# Patient Record
Sex: FEMALE | Race: WHITE | Employment: OTHER | URBAN - METROPOLITAN AREA
[De-identification: names, ages, dates, MRNs, and addresses within clinical notes are randomized per-mention and may not be internally consistent; named-entity substitution may affect disease eponyms.]

---

## 2019-02-05 ENCOUNTER — DOCUMENTATION ONLY (OUTPATIENT)
Dept: CARE COORDINATION | Facility: CLINIC | Age: 69
End: 2019-02-05

## 2019-02-21 ENCOUNTER — OFFICE VISIT (OUTPATIENT)
Dept: NEUROLOGY | Facility: CLINIC | Age: 69
End: 2019-02-21
Payer: COMMERCIAL

## 2019-02-21 VITALS
WEIGHT: 193.9 LBS | BODY MASS INDEX: 35.68 KG/M2 | HEIGHT: 62 IN | RESPIRATION RATE: 16 BRPM | OXYGEN SATURATION: 93 % | HEART RATE: 102 BPM | DIASTOLIC BLOOD PRESSURE: 101 MMHG | SYSTOLIC BLOOD PRESSURE: 166 MMHG | TEMPERATURE: 97.5 F

## 2019-02-21 DIAGNOSIS — R41.3 MEMORY LOSS: ICD-10-CM

## 2019-02-21 DIAGNOSIS — R26.9 ABNORMAL GAIT: Primary | ICD-10-CM

## 2019-02-21 PROBLEM — E78.00 PURE HYPERCHOLESTEROLEMIA: Status: ACTIVE | Noted: 2019-02-21

## 2019-02-21 PROBLEM — G81.94 LEFT HEMIPARESIS (H): Status: ACTIVE | Noted: 2019-02-21

## 2019-02-21 PROBLEM — M47.816 OSTEOARTHRITIS OF LUMBAR SPINE: Status: ACTIVE | Noted: 2017-07-14

## 2019-02-21 PROBLEM — N81.6 RECTOCELE: Status: ACTIVE | Noted: 2019-02-21

## 2019-02-21 PROBLEM — R26.89 IMBALANCE: Status: ACTIVE | Noted: 2017-07-14

## 2019-02-21 PROBLEM — M79.605 BILATERAL LEG PAIN: Status: ACTIVE | Noted: 2017-07-14

## 2019-02-21 PROBLEM — M79.604 BILATERAL LEG PAIN: Status: ACTIVE | Noted: 2017-07-14

## 2019-02-21 PROBLEM — I10 ESSENTIAL HYPERTENSION: Status: ACTIVE | Noted: 2019-02-21

## 2019-02-21 PROBLEM — Z00.00 HEALTH CARE MAINTENANCE: Status: ACTIVE | Noted: 2019-02-21

## 2019-02-21 PROBLEM — M17.11 PRIMARY OSTEOARTHRITIS OF RIGHT KNEE: Status: ACTIVE | Noted: 2018-04-17

## 2019-02-21 LAB — VIT B12 SERPL-MCNC: 875 PG/ML (ref 193–986)

## 2019-02-21 RX ORDER — LISINOPRIL 20 MG/1
20 TABLET ORAL DAILY
COMMUNITY
Start: 2018-04-02

## 2019-02-21 SDOH — HEALTH STABILITY: MENTAL HEALTH: HOW OFTEN DO YOU HAVE A DRINK CONTAINING ALCOHOL?: NEVER

## 2019-02-21 ASSESSMENT — ENCOUNTER SYMPTOMS
LIGHT-HEADEDNESS: 1
HYPOTENSION: 0
SLEEP DISTURBANCES DUE TO BREATHING: 0
NECK PAIN: 0
SEIZURES: 0
ORTHOPNEA: 0
STIFFNESS: 1
SPEECH CHANGE: 0
PARALYSIS: 0
TREMORS: 0
NUMBNESS: 0
HYPERTENSION: 1
JOINT SWELLING: 0
DYSURIA: 0
BACK PAIN: 0
PALPITATIONS: 0
LOSS OF CONSCIOUSNESS: 0
TINGLING: 0
DISTURBANCES IN COORDINATION: 1
MYALGIAS: 1
ARTHRALGIAS: 1
MUSCLE CRAMPS: 1
FLANK PAIN: 0
LEG PAIN: 0
HEADACHES: 1
SYNCOPE: 0
MUSCLE WEAKNESS: 0
EXERCISE INTOLERANCE: 0
DIFFICULTY URINATING: 0
MEMORY LOSS: 1
WEAKNESS: 1
HEMATURIA: 0
DIZZINESS: 1

## 2019-02-21 ASSESSMENT — PAIN SCALES - GENERAL: PAINLEVEL: NO PAIN (0)

## 2019-02-21 ASSESSMENT — MIFFLIN-ST. JEOR: SCORE: 1357.77
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
## 2019-02-21 NOTE — NURSING NOTE
Chief Complaint   Patient presents with     New Patient     [facility] NEW - CEREBRAL VENTRICULOMEGALY / MEMORY IMPAIRMENT     Hanna Martinez
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
## 2019-03-22 ENCOUNTER — THERAPY VISIT (OUTPATIENT)
Dept: PHYSICAL THERAPY | Facility: CLINIC | Age: 69
End: 2019-03-22
Payer: COMMERCIAL

## 2019-03-22 DIAGNOSIS — Z74.09 IMPAIRED FUNCTIONAL MOBILITY, BALANCE, GAIT, AND ENDURANCE: Primary | ICD-10-CM

## 2019-03-22 NOTE — DISCHARGE INSTRUCTIONS
3/22/19   - PT recommends using the walker at all times to help with balance: inside your house and outside as well.   - Goal of 3x week to get out and walk - at the gym: goal of 2 laps to start, when walking outside: 10-15 minutes   - Focus on upright posture and wider base of support with your feet while walking with the walker.
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
## 2019-03-29 ENCOUNTER — THERAPY VISIT (OUTPATIENT)
Dept: PHYSICAL THERAPY | Facility: CLINIC | Age: 69
End: 2019-03-29
Payer: COMMERCIAL

## 2019-03-29 ENCOUNTER — OFFICE VISIT (OUTPATIENT)
Dept: NEUROLOGY | Facility: CLINIC | Age: 69
End: 2019-03-29
Payer: COMMERCIAL

## 2019-03-29 VITALS
HEART RATE: 66 BPM | SYSTOLIC BLOOD PRESSURE: 125 MMHG | WEIGHT: 194.67 LBS | BODY MASS INDEX: 35.6 KG/M2 | OXYGEN SATURATION: 94 % | DIASTOLIC BLOOD PRESSURE: 78 MMHG

## 2019-03-29 DIAGNOSIS — R26.9 ABNORMAL GAIT: ICD-10-CM

## 2019-03-29 DIAGNOSIS — R41.3 MEMORY LOSS: Primary | ICD-10-CM

## 2019-03-29 ASSESSMENT — PAIN SCALES - GENERAL: PAINLEVEL: NO PAIN (0)

## 2019-03-29 NOTE — NURSING NOTE
Chief Complaint   Patient presents with     RECHECK     [facility] CONSULT       Martha Kingston, EMT
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
## 2019-04-08 ENCOUNTER — TELEPHONE (OUTPATIENT)
Dept: PHYSICAL THERAPY | Facility: CLINIC | Age: 69
End: 2019-04-08

## 2019-04-08 NOTE — TELEPHONE ENCOUNTER
----- Message from Alissa Dempsey, PT sent at 4/5/2019 12:09 PM CDT -----  Hello,     Can someone please call the above patient to see if she would like to schedule more PT appointments for follow up? If so, please schedule up to 4 visits 1x/week with Rosa Durham, ALOK.     Thank you!     Alissa Dempsey, PT

## 2019-04-29 ENCOUNTER — THERAPY VISIT (OUTPATIENT)
Dept: PHYSICAL THERAPY | Facility: CLINIC | Age: 69
End: 2019-04-29
Payer: COMMERCIAL

## 2019-04-29 DIAGNOSIS — R26.9 ABNORMAL GAIT: Primary | ICD-10-CM

## 2019-04-29 DIAGNOSIS — Z74.09 IMPAIRED FUNCTIONAL MOBILITY, BALANCE, GAIT, AND ENDURANCE: ICD-10-CM

## 2019-04-29 NOTE — DISCHARGE INSTRUCTIONS
4/29/19  - Add eye exercises to home exercise program. Complete at least 3-5x/day  - Add coordination exercises: toe taps to target placed on floor- forward and cross over. 30 seconds each at least 2x/day

## 2019-09-25 NOTE — PROGRESS NOTES
Outpatient Physical Therapy Discharge Note     Patient: Briseyda Garcia  : 1950    Beginning/End Dates of Reporting Period:  2019 to 2019    Referring Provider: Brenda Arellano MD    Therapy Diagnosis: Abnormal gait/dizziness     Client Self Report: When my blood sugar is low I get dizzy. Before I had lunch and it was 84. Reports no falls in the past 3-4 weeks. I was hoping for exercises to strengthen my core and balance. I've been walking the track, I touch the wall when I'm walking, using the right hand. Getting leg cramps with walking. In the back as well, and gastrocs.     Objective Measurements:         Goals:  Goal Identifier Gait    Goal Description Pt will ambulate 25 feet with AAD in 9 seconds or less with improved gait mechanics including, adequate foot clearance, improved step length, upright posture and no signs of imbalance    Target Date 19   Date Met      Progress:     Goal Identifier TUG    Goal Description Pt will improve score on TUG with AAD to 13.5 seconds or less with no imbalance in order to decrease risk for falling    Target Date 19   Date Met      Progress:     Goal Identifier mCTSIB    Goal Description Pt will improve score on conditions 4 and 5 of mCTSIB to 20 seconds or greater to demonstrate improved static balance    Target Date 19   Date Met      Progress:     Goal Identifier Falls prevention    Goal Description Pt will verbalize and/or demonstrate 3-5 falls prevention strategies in order to improve safety and decrease risk for falling    Target Date 19   Date Met      Progress:       Progress Toward Goals:   Not assessed this period.    Plan:  Discharge from therapy.    Discharge:    Reason for Discharge: Patient has failed to schedule further appointments.    Equipment Issued: N/A    Discharge Plan: Patient to continue home program.

## 2019-11-04 ENCOUNTER — HOSPITAL ENCOUNTER (INPATIENT)
Facility: CLINIC | Age: 69
Setting detail: SURGERY ADMIT
End: 2019-11-04
Attending: COLON & RECTAL SURGERY | Admitting: COLON & RECTAL SURGERY
Payer: COMMERCIAL

## 2019-11-05 ENCOUNTER — TRANSFERRED RECORDS (OUTPATIENT)
Dept: HEALTH INFORMATION MANAGEMENT | Facility: CLINIC | Age: 69
End: 2019-11-05

## 2020-01-09 VITALS — BODY MASS INDEX: 35.88 KG/M2 | WEIGHT: 195 LBS | HEIGHT: 62 IN

## 2020-01-09 ASSESSMENT — MIFFLIN-ST. JEOR: SCORE: 1357.76

## 2020-02-07 ENCOUNTER — HOSPITAL ENCOUNTER (INPATIENT)
Facility: CLINIC | Age: 70
Setting detail: SURGERY ADMIT
End: 2020-02-07
Attending: COLON & RECTAL SURGERY | Admitting: COLON & RECTAL SURGERY
Payer: COMMERCIAL

## 2020-03-13 VITALS — BODY MASS INDEX: 37.84 KG/M2 | WEIGHT: 200.4 LBS | HEIGHT: 61 IN

## 2020-03-13 RX ORDER — CLONIDINE HYDROCHLORIDE 0.1 MG/1
0.1 TABLET ORAL 2 TIMES DAILY
COMMUNITY
End: 2020-03-16 | Stop reason: HOSPADM

## 2020-03-13 ASSESSMENT — MIFFLIN-ST. JEOR: SCORE: 1373.04

## 2020-03-13 NOTE — OR NURSING
Patient states she was put on a new medication for her blood pressure.  She is supposed to take it twice a day but has been taking it only once a day due to feeling weak because of low blood pressure.  She states she had been falling also because of low blood pressure.  She had an echocardiogram recently which was abnormal but no follow up was recommended.      I brought this chart to the [facility]'s who stated she needs to be optimized for surgery and cleared by her cardiologist.    I called the patient back and asked her to make an appointment with Dr. Aguilar for the above and she stated that he did clear her for surgery over the phone.  We do not have any notes from him since 2019.  I will call his office to ask for this note.

## 2020-03-15 RX ORDER — CIPROFLOXACIN 2 MG/ML
400 INJECTION, SOLUTION INTRAVENOUS SEE ADMIN INSTRUCTIONS
Status: CANCELLED | OUTPATIENT
Start: 2020-03-15

## 2020-03-15 RX ORDER — ALVIMOPAN 12 MG/1
12 CAPSULE ORAL ONCE
Status: CANCELLED | OUTPATIENT
Start: 2020-03-15 | End: 2020-03-15

## 2020-03-15 RX ORDER — CIPROFLOXACIN 2 MG/ML
400 INJECTION, SOLUTION INTRAVENOUS
Status: CANCELLED | OUTPATIENT
Start: 2020-03-15

## 2020-03-15 RX ORDER — HEPARIN SODIUM 5000 [USP'U]/.5ML
5000 INJECTION, SOLUTION INTRAVENOUS; SUBCUTANEOUS
Status: CANCELLED | OUTPATIENT
Start: 2020-03-15

## 2020-03-15 RX ORDER — ACETAMINOPHEN 325 MG/1
975 TABLET ORAL ONCE
Status: CANCELLED | OUTPATIENT
Start: 2020-03-15 | End: 2020-03-15

## 2020-05-01 DIAGNOSIS — Z11.59 ENCOUNTER FOR SCREENING FOR OTHER VIRAL DISEASES: Primary | ICD-10-CM

## 2020-05-19 ENCOUNTER — TRANSFERRED RECORDS (OUTPATIENT)
Dept: HEALTH INFORMATION MANAGEMENT | Facility: CLINIC | Age: 70
End: 2020-05-19

## 2020-07-30 RX ORDER — CLONIDINE 0.1 MG/24H
1 PATCH, EXTENDED RELEASE TRANSDERMAL WEEKLY
COMMUNITY

## 2020-07-30 RX ORDER — CLONIDINE HYDROCHLORIDE 0.1 MG/1
0.1 TABLET ORAL DAILY PRN
COMMUNITY

## 2020-07-31 NOTE — PHARMACY
07/31/20 - Per patient's . pt has hypertropic cardiomyopathy; if blood pressure drops need to use phenylephrine; refer to Dr. Aguilar (Cardiology) note from [facility]; Please relay information to Anesthesia. Zoë Richmond, MikhailD

## 2020-07-31 NOTE — PHARMACY-ADMISSION MEDICATION HISTORY
PTA meds completed by pre-admitting nurse Verna Goldstein RN on 7/30/2020 and reviewed by pharmacy. Pharmacist called patient to clarify home medications. Added clonidine patch and tablet to med list.       Prior to Admission medications    Medication Sig Last Dose Taking? Auth Provider   aspirin (ASA) 81 MG tablet Take 81 mg by mouth daily   Yes Reported, Patient   cloNIDine (CATAPRES) 0.1 MG tablet Take 0.1 mg by mouth daily as needed When diastolic BP is over 100 mmhg.  Yes Unknown, Entered By History   cloNIDine (CATAPRES-TTS1) 0.1 MG/24HR WK patch Place 1 patch onto the skin once a week On tuesdays 7/28/2020 Yes Unknown, Entered By History   lisinopril (PRINIVIL/ZESTRIL) 20 MG tablet Take 20 mg by mouth daily   Yes Reported, Patient

## 2020-08-03 DIAGNOSIS — Z01.818 PRE-OP EXAM: Primary | ICD-10-CM

## 2020-08-03 PROCEDURE — U0003 INFECTIOUS AGENT DETECTION BY NUCLEIC ACID (DNA OR RNA); SEVERE ACUTE RESPIRATORY SYNDROME CORONAVIRUS 2 (SARS-COV-2) (CORONAVIRUS DISEASE [COVID-19]), AMPLIFIED PROBE TECHNIQUE, MAKING USE OF HIGH THROUGHPUT TECHNOLOGIES AS DESCRIBED BY CMS-2020-01-R: HCPCS | Performed by: COLON & RECTAL SURGERY

## 2020-08-04 LAB
SARS-COV-2 RNA SPEC QL NAA+PROBE: NOT DETECTED
SPECIMEN SOURCE: NORMAL

## 2020-08-05 ENCOUNTER — ANESTHESIA EVENT (OUTPATIENT)
Dept: SURGERY | Facility: CLINIC | Age: 70
DRG: 330 | End: 2020-08-05
Payer: COMMERCIAL

## 2020-08-05 ENCOUNTER — HOSPITAL ENCOUNTER (INPATIENT)
Facility: CLINIC | Age: 70
LOS: 5 days | Discharge: NURSING, REHAB OR LONG-TERM CARE | DRG: 330 | End: 2020-08-10
Attending: COLON & RECTAL SURGERY | Admitting: COLON & RECTAL SURGERY
Payer: COMMERCIAL

## 2020-08-05 ENCOUNTER — ANESTHESIA (OUTPATIENT)
Dept: SURGERY | Facility: CLINIC | Age: 70
DRG: 330 | End: 2020-08-05
Payer: COMMERCIAL

## 2020-08-05 PROBLEM — D12.2 ADENOMATOUS POLYP OF ASCENDING COLON: Status: ACTIVE | Noted: 2020-08-05

## 2020-08-05 LAB
CREAT SERPL-MCNC: 0.72 MG/DL (ref 0.52–1.04)
GFR SERPL CREATININE-BSD FRML MDRD: 84 ML/MIN/{1.73_M2}
PLATELET # BLD AUTO: 200 10E9/L (ref 150–450)

## 2020-08-05 PROCEDURE — 40000306 ZZH STATISTIC PRE PROC ASSESS II: Performed by: COLON & RECTAL SURGERY

## 2020-08-05 PROCEDURE — 88305 TISSUE EXAM BY PATHOLOGIST: CPT | Performed by: COLON & RECTAL SURGERY

## 2020-08-05 PROCEDURE — 71000012 ZZH RECOVERY PHASE 1 LEVEL 1 FIRST HR: Performed by: COLON & RECTAL SURGERY

## 2020-08-05 PROCEDURE — 37000009 ZZH ANESTHESIA TECHNICAL FEE, EACH ADDTL 15 MIN: Performed by: COLON & RECTAL SURGERY

## 2020-08-05 PROCEDURE — 82565 ASSAY OF CREATININE: CPT | Performed by: COLON & RECTAL SURGERY

## 2020-08-05 PROCEDURE — 25800030 ZZH RX IP 258 OP 636: Performed by: ANESTHESIOLOGY

## 2020-08-05 PROCEDURE — 25000128 H RX IP 250 OP 636: Performed by: NURSE ANESTHETIST, CERTIFIED REGISTERED

## 2020-08-05 PROCEDURE — 37000008 ZZH ANESTHESIA TECHNICAL FEE, 1ST 30 MIN: Performed by: COLON & RECTAL SURGERY

## 2020-08-05 PROCEDURE — 25000128 H RX IP 250 OP 636: Performed by: COLON & RECTAL SURGERY

## 2020-08-05 PROCEDURE — 36000063 ZZH SURGERY LEVEL 4 EA 15 ADDTL MIN: Performed by: COLON & RECTAL SURGERY

## 2020-08-05 PROCEDURE — 85049 AUTOMATED PLATELET COUNT: CPT | Performed by: COLON & RECTAL SURGERY

## 2020-08-05 PROCEDURE — 0DBP7ZZ EXCISION OF RECTUM, VIA NATURAL OR ARTIFICIAL OPENING: ICD-10-PCS | Performed by: COLON & RECTAL SURGERY

## 2020-08-05 PROCEDURE — 88305 TISSUE EXAM BY PATHOLOGIST: CPT | Mod: 26 | Performed by: COLON & RECTAL SURGERY

## 2020-08-05 PROCEDURE — 25000125 ZZHC RX 250: Performed by: NURSE ANESTHETIST, CERTIFIED REGISTERED

## 2020-08-05 PROCEDURE — 36000093 ZZH SURGERY LEVEL 4 1ST 30 MIN: Performed by: COLON & RECTAL SURGERY

## 2020-08-05 PROCEDURE — 88309 TISSUE EXAM BY PATHOLOGIST: CPT | Mod: 26 | Performed by: COLON & RECTAL SURGERY

## 2020-08-05 PROCEDURE — 88309 TISSUE EXAM BY PATHOLOGIST: CPT | Performed by: COLON & RECTAL SURGERY

## 2020-08-05 PROCEDURE — 71000013 ZZH RECOVERY PHASE 1 LEVEL 1 EA ADDTL HR: Performed by: COLON & RECTAL SURGERY

## 2020-08-05 PROCEDURE — 25000125 ZZHC RX 250: Performed by: COLON & RECTAL SURGERY

## 2020-08-05 PROCEDURE — 12000000 ZZH R&B MED SURG/OB

## 2020-08-05 PROCEDURE — 25000132 ZZH RX MED GY IP 250 OP 250 PS 637: Performed by: COLON & RECTAL SURGERY

## 2020-08-05 PROCEDURE — 36415 COLL VENOUS BLD VENIPUNCTURE: CPT | Performed by: COLON & RECTAL SURGERY

## 2020-08-05 PROCEDURE — 0DJD8ZZ INSPECTION OF LOWER INTESTINAL TRACT, VIA NATURAL OR ARTIFICIAL OPENING ENDOSCOPIC: ICD-10-PCS | Performed by: COLON & RECTAL SURGERY

## 2020-08-05 PROCEDURE — 25800030 ZZH RX IP 258 OP 636: Performed by: COLON & RECTAL SURGERY

## 2020-08-05 PROCEDURE — 25800030 ZZH RX IP 258 OP 636: Performed by: NURSE ANESTHETIST, CERTIFIED REGISTERED

## 2020-08-05 PROCEDURE — 27210794 ZZH OR GENERAL SUPPLY STERILE: Performed by: COLON & RECTAL SURGERY

## 2020-08-05 PROCEDURE — 0DTF0ZZ RESECTION OF RIGHT LARGE INTESTINE, OPEN APPROACH: ICD-10-PCS | Performed by: COLON & RECTAL SURGERY

## 2020-08-05 RX ORDER — ALVIMOPAN 12 MG/1
12 CAPSULE ORAL 2 TIMES DAILY
Status: DISCONTINUED | OUTPATIENT
Start: 2020-08-06 | End: 2020-08-08

## 2020-08-05 RX ORDER — ACETAMINOPHEN 325 MG/1
975 TABLET ORAL ONCE
Status: COMPLETED | OUTPATIENT
Start: 2020-08-05 | End: 2020-08-05

## 2020-08-05 RX ORDER — CIPROFLOXACIN 2 MG/ML
400 INJECTION, SOLUTION INTRAVENOUS EVERY 12 HOURS
Status: COMPLETED | OUTPATIENT
Start: 2020-08-06 | End: 2020-08-06

## 2020-08-05 RX ORDER — ONDANSETRON 2 MG/ML
INJECTION INTRAMUSCULAR; INTRAVENOUS PRN
Status: DISCONTINUED | OUTPATIENT
Start: 2020-08-05 | End: 2020-08-05

## 2020-08-05 RX ORDER — EPHEDRINE SULFATE 50 MG/ML
INJECTION, SOLUTION INTRAMUSCULAR; INTRAVENOUS; SUBCUTANEOUS PRN
Status: DISCONTINUED | OUTPATIENT
Start: 2020-08-05 | End: 2020-08-05

## 2020-08-05 RX ORDER — MEPERIDINE HYDROCHLORIDE 25 MG/ML
12.5 INJECTION INTRAMUSCULAR; INTRAVENOUS; SUBCUTANEOUS
Status: DISCONTINUED | OUTPATIENT
Start: 2020-08-05 | End: 2020-08-05 | Stop reason: HOSPADM

## 2020-08-05 RX ORDER — LIDOCAINE 40 MG/G
CREAM TOPICAL
Status: DISCONTINUED | OUTPATIENT
Start: 2020-08-05 | End: 2020-08-10 | Stop reason: HOSPADM

## 2020-08-05 RX ORDER — LIDOCAINE HYDROCHLORIDE 10 MG/ML
INJECTION, SOLUTION INFILTRATION; PERINEURAL PRN
Status: DISCONTINUED | OUTPATIENT
Start: 2020-08-05 | End: 2020-08-05

## 2020-08-05 RX ORDER — HYDRALAZINE HYDROCHLORIDE 20 MG/ML
2.5-5 INJECTION INTRAMUSCULAR; INTRAVENOUS EVERY 10 MIN PRN
Status: DISCONTINUED | OUTPATIENT
Start: 2020-08-05 | End: 2020-08-05 | Stop reason: HOSPADM

## 2020-08-05 RX ORDER — FENTANYL CITRATE 50 UG/ML
25-50 INJECTION, SOLUTION INTRAMUSCULAR; INTRAVENOUS
Status: DISCONTINUED | OUTPATIENT
Start: 2020-08-05 | End: 2020-08-05 | Stop reason: HOSPADM

## 2020-08-05 RX ORDER — NALOXONE HYDROCHLORIDE 0.4 MG/ML
.1-.4 INJECTION, SOLUTION INTRAMUSCULAR; INTRAVENOUS; SUBCUTANEOUS
Status: DISCONTINUED | OUTPATIENT
Start: 2020-08-05 | End: 2020-08-05 | Stop reason: HOSPADM

## 2020-08-05 RX ORDER — CIPROFLOXACIN 2 MG/ML
400 INJECTION, SOLUTION INTRAVENOUS
Status: DISCONTINUED | OUTPATIENT
Start: 2020-08-05 | End: 2020-08-05 | Stop reason: HOSPADM

## 2020-08-05 RX ORDER — OXYCODONE HYDROCHLORIDE 5 MG/1
5 TABLET ORAL EVERY 4 HOURS PRN
Status: DISCONTINUED | OUTPATIENT
Start: 2020-08-05 | End: 2020-08-10 | Stop reason: HOSPADM

## 2020-08-05 RX ORDER — ALVIMOPAN 12 MG/1
12 CAPSULE ORAL ONCE
Status: COMPLETED | OUTPATIENT
Start: 2020-08-05 | End: 2020-08-05

## 2020-08-05 RX ORDER — SODIUM CHLORIDE, SODIUM LACTATE, POTASSIUM CHLORIDE, CALCIUM CHLORIDE 600; 310; 30; 20 MG/100ML; MG/100ML; MG/100ML; MG/100ML
INJECTION, SOLUTION INTRAVENOUS CONTINUOUS
Status: DISCONTINUED | OUTPATIENT
Start: 2020-08-05 | End: 2020-08-05 | Stop reason: HOSPADM

## 2020-08-05 RX ORDER — ALBUTEROL SULFATE 0.83 MG/ML
2.5 SOLUTION RESPIRATORY (INHALATION) EVERY 4 HOURS PRN
Status: DISCONTINUED | OUTPATIENT
Start: 2020-08-05 | End: 2020-08-05 | Stop reason: HOSPADM

## 2020-08-05 RX ORDER — FENTANYL CITRATE 50 UG/ML
INJECTION, SOLUTION INTRAMUSCULAR; INTRAVENOUS PRN
Status: DISCONTINUED | OUTPATIENT
Start: 2020-08-05 | End: 2020-08-05

## 2020-08-05 RX ORDER — METOPROLOL TARTRATE 1 MG/ML
1-2 INJECTION, SOLUTION INTRAVENOUS EVERY 5 MIN PRN
Status: DISCONTINUED | OUTPATIENT
Start: 2020-08-05 | End: 2020-08-05 | Stop reason: HOSPADM

## 2020-08-05 RX ORDER — ONDANSETRON 4 MG/1
4 TABLET, ORALLY DISINTEGRATING ORAL EVERY 30 MIN PRN
Status: DISCONTINUED | OUTPATIENT
Start: 2020-08-05 | End: 2020-08-05 | Stop reason: HOSPADM

## 2020-08-05 RX ORDER — NEOSTIGMINE METHYLSULFATE 1 MG/ML
VIAL (ML) INJECTION PRN
Status: DISCONTINUED | OUTPATIENT
Start: 2020-08-05 | End: 2020-08-05

## 2020-08-05 RX ORDER — PROPOFOL 10 MG/ML
INJECTION, EMULSION INTRAVENOUS PRN
Status: DISCONTINUED | OUTPATIENT
Start: 2020-08-05 | End: 2020-08-05

## 2020-08-05 RX ORDER — DEXAMETHASONE SODIUM PHOSPHATE 4 MG/ML
INJECTION, SOLUTION INTRA-ARTICULAR; INTRALESIONAL; INTRAMUSCULAR; INTRAVENOUS; SOFT TISSUE PRN
Status: DISCONTINUED | OUTPATIENT
Start: 2020-08-05 | End: 2020-08-05

## 2020-08-05 RX ORDER — ACETAMINOPHEN 325 MG/1
975 TABLET ORAL EVERY 8 HOURS
Status: DISPENSED | OUTPATIENT
Start: 2020-08-05 | End: 2020-08-08

## 2020-08-05 RX ORDER — BUPIVACAINE HYDROCHLORIDE AND EPINEPHRINE 2.5; 5 MG/ML; UG/ML
INJECTION, SOLUTION EPIDURAL; INFILTRATION; INTRACAUDAL; PERINEURAL PRN
Status: DISCONTINUED | OUTPATIENT
Start: 2020-08-05 | End: 2020-08-05 | Stop reason: HOSPADM

## 2020-08-05 RX ORDER — NALOXONE HYDROCHLORIDE 0.4 MG/ML
.1-.4 INJECTION, SOLUTION INTRAMUSCULAR; INTRAVENOUS; SUBCUTANEOUS
Status: DISCONTINUED | OUTPATIENT
Start: 2020-08-05 | End: 2020-08-10 | Stop reason: HOSPADM

## 2020-08-05 RX ORDER — ONDANSETRON 2 MG/ML
4 INJECTION INTRAMUSCULAR; INTRAVENOUS EVERY 6 HOURS PRN
Status: DISCONTINUED | OUTPATIENT
Start: 2020-08-05 | End: 2020-08-10 | Stop reason: HOSPADM

## 2020-08-05 RX ORDER — SODIUM CHLORIDE, SODIUM LACTATE, POTASSIUM CHLORIDE, CALCIUM CHLORIDE 600; 310; 30; 20 MG/100ML; MG/100ML; MG/100ML; MG/100ML
INJECTION, SOLUTION INTRAVENOUS CONTINUOUS
Status: DISCONTINUED | OUTPATIENT
Start: 2020-08-05 | End: 2020-08-08

## 2020-08-05 RX ORDER — SODIUM CHLORIDE, SODIUM LACTATE, POTASSIUM CHLORIDE, CALCIUM CHLORIDE 600; 310; 30; 20 MG/100ML; MG/100ML; MG/100ML; MG/100ML
INJECTION, SOLUTION INTRAVENOUS CONTINUOUS PRN
Status: DISCONTINUED | OUTPATIENT
Start: 2020-08-05 | End: 2020-08-05

## 2020-08-05 RX ORDER — HEPARIN SODIUM 5000 [USP'U]/.5ML
5000 INJECTION, SOLUTION INTRAVENOUS; SUBCUTANEOUS
Status: DISCONTINUED | OUTPATIENT
Start: 2020-08-05 | End: 2020-08-05 | Stop reason: HOSPADM

## 2020-08-05 RX ORDER — CIPROFLOXACIN 2 MG/ML
400 INJECTION, SOLUTION INTRAVENOUS SEE ADMIN INSTRUCTIONS
Status: DISCONTINUED | OUTPATIENT
Start: 2020-08-05 | End: 2020-08-05 | Stop reason: HOSPADM

## 2020-08-05 RX ORDER — LIDOCAINE 40 MG/G
CREAM TOPICAL
Status: DISCONTINUED | OUTPATIENT
Start: 2020-08-05 | End: 2020-08-05 | Stop reason: HOSPADM

## 2020-08-05 RX ORDER — GLYCOPYRROLATE 0.2 MG/ML
INJECTION, SOLUTION INTRAMUSCULAR; INTRAVENOUS PRN
Status: DISCONTINUED | OUTPATIENT
Start: 2020-08-05 | End: 2020-08-05

## 2020-08-05 RX ORDER — HYDROMORPHONE HYDROCHLORIDE 1 MG/ML
.2-.3 INJECTION, SOLUTION INTRAMUSCULAR; INTRAVENOUS; SUBCUTANEOUS
Status: DISCONTINUED | OUTPATIENT
Start: 2020-08-05 | End: 2020-08-10 | Stop reason: HOSPADM

## 2020-08-05 RX ORDER — MINERAL OIL
OIL (ML) MISCELLANEOUS PRN
Status: DISCONTINUED | OUTPATIENT
Start: 2020-08-05 | End: 2020-08-05 | Stop reason: HOSPADM

## 2020-08-05 RX ORDER — FENTANYL CITRATE 50 UG/ML
25-50 INJECTION, SOLUTION INTRAMUSCULAR; INTRAVENOUS EVERY 5 MIN PRN
Status: DISCONTINUED | OUTPATIENT
Start: 2020-08-05 | End: 2020-08-05 | Stop reason: HOSPADM

## 2020-08-05 RX ORDER — HYDROMORPHONE HYDROCHLORIDE 1 MG/ML
.3-.5 INJECTION, SOLUTION INTRAMUSCULAR; INTRAVENOUS; SUBCUTANEOUS EVERY 10 MIN PRN
Status: DISCONTINUED | OUTPATIENT
Start: 2020-08-05 | End: 2020-08-05 | Stop reason: HOSPADM

## 2020-08-05 RX ORDER — ONDANSETRON 2 MG/ML
4 INJECTION INTRAMUSCULAR; INTRAVENOUS EVERY 30 MIN PRN
Status: DISCONTINUED | OUTPATIENT
Start: 2020-08-05 | End: 2020-08-05 | Stop reason: HOSPADM

## 2020-08-05 RX ADMIN — PHENYLEPHRINE HYDROCHLORIDE 200 MCG: 10 INJECTION INTRAVENOUS at 13:50

## 2020-08-05 RX ADMIN — METRONIDAZOLE 500 MG: 500 INJECTION, SOLUTION INTRAVENOUS at 13:33

## 2020-08-05 RX ADMIN — ONDANSETRON HYDROCHLORIDE 4 MG: 2 INJECTION, SOLUTION INTRAVENOUS at 18:00

## 2020-08-05 RX ADMIN — FENTANYL CITRATE 100 MCG: 50 INJECTION, SOLUTION INTRAMUSCULAR; INTRAVENOUS at 13:07

## 2020-08-05 RX ADMIN — MIDAZOLAM 1 MG: 1 INJECTION INTRAMUSCULAR; INTRAVENOUS at 13:07

## 2020-08-05 RX ADMIN — SODIUM CHLORIDE, POTASSIUM CHLORIDE, SODIUM LACTATE AND CALCIUM CHLORIDE: 600; 310; 30; 20 INJECTION, SOLUTION INTRAVENOUS at 14:46

## 2020-08-05 RX ADMIN — ACETAMINOPHEN 975 MG: 325 TABLET, FILM COATED ORAL at 12:21

## 2020-08-05 RX ADMIN — GLYCOPYRROLATE 0.6 MG: 0.2 INJECTION, SOLUTION INTRAMUSCULAR; INTRAVENOUS at 18:21

## 2020-08-05 RX ADMIN — HYDROMORPHONE HYDROCHLORIDE 0.3 MG: 1 INJECTION, SOLUTION INTRAMUSCULAR; INTRAVENOUS; SUBCUTANEOUS at 21:44

## 2020-08-05 RX ADMIN — FENTANYL CITRATE 50 MCG: 50 INJECTION, SOLUTION INTRAMUSCULAR; INTRAVENOUS at 16:22

## 2020-08-05 RX ADMIN — Medication 10 MG: at 14:07

## 2020-08-05 RX ADMIN — MIDAZOLAM 1 MG: 1 INJECTION INTRAMUSCULAR; INTRAVENOUS at 13:01

## 2020-08-05 RX ADMIN — PHENYLEPHRINE HYDROCHLORIDE 100 MCG: 10 INJECTION INTRAVENOUS at 14:24

## 2020-08-05 RX ADMIN — FENTANYL CITRATE 100 MCG: 50 INJECTION, SOLUTION INTRAMUSCULAR; INTRAVENOUS at 15:38

## 2020-08-05 RX ADMIN — Medication 3 MG: at 18:21

## 2020-08-05 RX ADMIN — SODIUM CHLORIDE, POTASSIUM CHLORIDE, SODIUM LACTATE AND CALCIUM CHLORIDE: 600; 310; 30; 20 INJECTION, SOLUTION INTRAVENOUS at 20:06

## 2020-08-05 RX ADMIN — GLYCOPYRROLATE 0.1 MG: 0.2 INJECTION, SOLUTION INTRAMUSCULAR; INTRAVENOUS at 17:09

## 2020-08-05 RX ADMIN — ROCURONIUM BROMIDE 50 MG: 10 INJECTION INTRAVENOUS at 13:07

## 2020-08-05 RX ADMIN — ROCURONIUM BROMIDE 20 MG: 10 INJECTION INTRAVENOUS at 14:07

## 2020-08-05 RX ADMIN — PHENYLEPHRINE HYDROCHLORIDE 200 MCG: 10 INJECTION INTRAVENOUS at 14:35

## 2020-08-05 RX ADMIN — ROCURONIUM BROMIDE 20 MG: 10 INJECTION INTRAVENOUS at 15:12

## 2020-08-05 RX ADMIN — DEXAMETHASONE SODIUM PHOSPHATE 4 MG: 4 INJECTION, SOLUTION INTRA-ARTICULAR; INTRALESIONAL; INTRAMUSCULAR; INTRAVENOUS; SOFT TISSUE at 13:07

## 2020-08-05 RX ADMIN — PHENYLEPHRINE HYDROCHLORIDE 100 MCG: 10 INJECTION INTRAVENOUS at 13:07

## 2020-08-05 RX ADMIN — FENTANYL CITRATE 100 MCG: 50 INJECTION, SOLUTION INTRAMUSCULAR; INTRAVENOUS at 15:59

## 2020-08-05 RX ADMIN — METRONIDAZOLE 500 MG: 500 INJECTION, SOLUTION INTRAVENOUS at 21:44

## 2020-08-05 RX ADMIN — Medication 5 MG: at 15:49

## 2020-08-05 RX ADMIN — Medication 10 MG: at 14:21

## 2020-08-05 RX ADMIN — HYDROMORPHONE HYDROCHLORIDE 1 MG: 1 INJECTION, SOLUTION INTRAMUSCULAR; INTRAVENOUS; SUBCUTANEOUS at 13:07

## 2020-08-05 RX ADMIN — LIDOCAINE HYDROCHLORIDE 20 MG: 10 INJECTION, SOLUTION INFILTRATION; PERINEURAL at 13:07

## 2020-08-05 RX ADMIN — SODIUM CHLORIDE, POTASSIUM CHLORIDE, SODIUM LACTATE AND CALCIUM CHLORIDE: 600; 310; 30; 20 INJECTION, SOLUTION INTRAVENOUS at 13:50

## 2020-08-05 RX ADMIN — ROCURONIUM BROMIDE 20 MG: 10 INJECTION INTRAVENOUS at 13:43

## 2020-08-05 RX ADMIN — ACETAMINOPHEN 975 MG: 325 TABLET, FILM COATED ORAL at 21:44

## 2020-08-05 RX ADMIN — ALVIMOPAN 12 MG: 12 CAPSULE ORAL at 12:21

## 2020-08-05 RX ADMIN — CIPROFLOXACIN 400 MG: 2 INJECTION, SOLUTION INTRAVENOUS at 23:31

## 2020-08-05 RX ADMIN — PHENYLEPHRINE HYDROCHLORIDE 200 MCG: 10 INJECTION INTRAVENOUS at 13:25

## 2020-08-05 RX ADMIN — PHENYLEPHRINE HYDROCHLORIDE 200 MCG: 10 INJECTION INTRAVENOUS at 13:57

## 2020-08-05 RX ADMIN — PROPOFOL 140 MG: 10 INJECTION, EMULSION INTRAVENOUS at 13:07

## 2020-08-05 RX ADMIN — Medication 0.3 MCG/KG/HR: at 14:00

## 2020-08-05 RX ADMIN — FENTANYL CITRATE 100 MCG: 50 INJECTION, SOLUTION INTRAMUSCULAR; INTRAVENOUS at 16:06

## 2020-08-05 RX ADMIN — SODIUM CHLORIDE, POTASSIUM CHLORIDE, SODIUM LACTATE AND CALCIUM CHLORIDE: 600; 310; 30; 20 INJECTION, SOLUTION INTRAVENOUS at 16:48

## 2020-08-05 RX ADMIN — SODIUM CHLORIDE, POTASSIUM CHLORIDE, SODIUM LACTATE AND CALCIUM CHLORIDE: 600; 310; 30; 20 INJECTION, SOLUTION INTRAVENOUS at 12:06

## 2020-08-05 RX ADMIN — ROCURONIUM BROMIDE 10 MG: 10 INJECTION INTRAVENOUS at 15:37

## 2020-08-05 RX ADMIN — PHENYLEPHRINE HYDROCHLORIDE 200 MCG: 10 INJECTION INTRAVENOUS at 17:45

## 2020-08-05 RX ADMIN — PHENYLEPHRINE HYDROCHLORIDE 200 MCG: 10 INJECTION INTRAVENOUS at 17:55

## 2020-08-05 RX ADMIN — ROCURONIUM BROMIDE 20 MG: 10 INJECTION INTRAVENOUS at 17:42

## 2020-08-05 RX ADMIN — FENTANYL CITRATE 50 MCG: 50 INJECTION, SOLUTION INTRAMUSCULAR; INTRAVENOUS at 15:43

## 2020-08-05 RX ADMIN — PHENYLEPHRINE HYDROCHLORIDE 200 MCG: 10 INJECTION INTRAVENOUS at 13:39

## 2020-08-05 RX ADMIN — SODIUM CHLORIDE, POTASSIUM CHLORIDE, SODIUM LACTATE AND CALCIUM CHLORIDE: 600; 310; 30; 20 INJECTION, SOLUTION INTRAVENOUS at 17:21

## 2020-08-05 RX ADMIN — CIPROFLOXACIN 400 MG: 2 INJECTION INTRAVENOUS at 13:03

## 2020-08-05 ASSESSMENT — MIFFLIN-ST. JEOR: SCORE: 1383.25

## 2020-08-05 ASSESSMENT — ACTIVITIES OF DAILY LIVING (ADL): ADLS_ACUITY_SCORE: 18

## 2020-08-05 NOTE — ANESTHESIA CARE TRANSFER NOTE
Patient: Briseyda Garcia    Procedure(s):  Laparoscopic assisted right colectomy  with transanal endoscopic microsurgery    Diagnosis: History of colon polyps [Z86.010]  Rectal polyp [K62.1]  Diagnosis Additional Information: No value filed.    Anesthesia Type:   General     Note:  Airway :ETT  Patient transferred to:PACU  Handoff Report: Identifed the Patient, Identified the Reponsible Provider, Reviewed the pertinent medical history, Discussed the surgical course, Reviewed Intra-OP anesthesia mangement and issues during anesthesia, Set expectations for post-procedure period and Allowed opportunity for questions and acknowledgement of understanding      Vitals: (Last set prior to Anesthesia Care Transfer)    CRNA VITALS  8/5/2020 1801 - 8/5/2020 1838      8/5/2020             Pulse:  64    SpO2:  97 %    Resp Rate (observed):  10                Electronically Signed By: JJ Grubbs CRNA  August 5, 2020  6:38 PM

## 2020-08-05 NOTE — BRIEF OP NOTE
[facility]    Brief Operative Note    Pre-operative diagnosis: History of colon polyps [Z86.010]  Rectal polyp [K62.1]  Post-operative diagnosis Same as pre-operative diagnosis    Procedure: Procedure(s):  Laparoscopic assisted right colectomy  with transanal endoscopic microsurgery  Surgeon: Surgeon(s) and Role:     * Ngoc Woods MD - Primary     * Monica Cornejo MD - Assisting  Anesthesia: General   Estimated blood loss: Less than 50 ml  Drains: None  Specimens:   ID Type Source Tests Collected by Time Destination   A : Rectal polyp Tissue Large Intestine, Rectum SURGICAL PATHOLOGY EXAM Ngoc Woods MD 8/5/2020  2:36 PM    B : TERMINAL ILEUM, RIGHT COLON, APPENDIX Tissue Colon SURGICAL PATHOLOGY EXAM Ngoc Woods MD 8/5/2020  5:57 PM      Findings:   Large polypoid lesion in the cecum, completely resected. Large polypoid lesion in the rectum, completely resected..  Complications: None apparent. .  Implants: * No implants in log *       Liss Bustos MD   [facility]   [phone].        ADDENDUM:    PATIENT DATA  Indicate Y or N:  Home O2 N  Hemodialysis n  Transplant patient n  Cirrhosis n  Steroids in last 30 days n  Immunomodulators in last 30 days n  Anticoagulation at time of surgery n   List medication n/a  Prior abdominal surgery n  Pelvic irradiation n    Albumin within 30 days if known n/a  Hgb within 30 days if known n/a  Cr within 30 days if known n/a    OR DATA  Emergent n   <24 hours n   <1 week n  Bowel Prep (Y or N) y  Antibiotics (Y or N) y  DVT prophylaxis    Heparin y   SCD y  Drain none  ASA (1,2,3,4) 3  OR time (min) 279.  Stents n  Transfuse >/= 2U n  Anastomosis   Stapled y   Handsewn n
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
## 2020-08-06 ENCOUNTER — APPOINTMENT (OUTPATIENT)
Dept: PHYSICAL THERAPY | Facility: CLINIC | Age: 70
DRG: 330 | End: 2020-08-06
Attending: COLON & RECTAL SURGERY
Payer: COMMERCIAL

## 2020-08-06 LAB
GLUCOSE BLDC GLUCOMTR-MCNC: 105 MG/DL (ref 70–99)
INTERPRETATION ECG - MUSE: NORMAL

## 2020-08-06 PROCEDURE — 99221 1ST HOSP IP/OBS SF/LOW 40: CPT | Performed by: INTERNAL MEDICINE

## 2020-08-06 PROCEDURE — 25800030 ZZH RX IP 258 OP 636: Performed by: COLON & RECTAL SURGERY

## 2020-08-06 PROCEDURE — 25000125 ZZHC RX 250: Performed by: INTERNAL MEDICINE

## 2020-08-06 PROCEDURE — 00000146 ZZHCL STATISTIC GLUCOSE BY METER IP

## 2020-08-06 PROCEDURE — 97161 PT EVAL LOW COMPLEX 20 MIN: CPT | Mod: GP | Performed by: PHYSICAL THERAPIST

## 2020-08-06 PROCEDURE — 12000000 ZZH R&B MED SURG/OB

## 2020-08-06 PROCEDURE — 25000132 ZZH RX MED GY IP 250 OP 250 PS 637: Performed by: COLON & RECTAL SURGERY

## 2020-08-06 PROCEDURE — 99207 ZZC CDG-CHARGE REQUIRED MANUAL ENTRY: CPT | Performed by: INTERNAL MEDICINE

## 2020-08-06 PROCEDURE — 25000128 H RX IP 250 OP 636: Performed by: COLON & RECTAL SURGERY

## 2020-08-06 PROCEDURE — 25000128 H RX IP 250 OP 636: Performed by: PHYSICIAN ASSISTANT

## 2020-08-06 PROCEDURE — 99207 ZZC APP CREDIT; MD BILLING SHARED VISIT: CPT | Performed by: PHYSICIAN ASSISTANT

## 2020-08-06 PROCEDURE — 97530 THERAPEUTIC ACTIVITIES: CPT | Mod: GP | Performed by: PHYSICAL THERAPIST

## 2020-08-06 RX ORDER — LISINOPRIL 20 MG/1
20 TABLET ORAL DAILY
Status: DISCONTINUED | OUTPATIENT
Start: 2020-08-06 | End: 2020-08-10 | Stop reason: HOSPADM

## 2020-08-06 RX ORDER — LABETALOL 20 MG/4 ML (5 MG/ML) INTRAVENOUS SYRINGE
20
Status: DISCONTINUED | OUTPATIENT
Start: 2020-08-06 | End: 2020-08-10 | Stop reason: HOSPADM

## 2020-08-06 RX ORDER — ENALAPRILAT 1.25 MG/ML
1.25 INJECTION INTRAVENOUS EVERY 6 HOURS
Status: DISCONTINUED | OUTPATIENT
Start: 2020-08-06 | End: 2020-08-08

## 2020-08-06 RX ORDER — CLONIDINE 0.1 MG/24H
1 PATCH, EXTENDED RELEASE TRANSDERMAL WEEKLY
Status: DISCONTINUED | OUTPATIENT
Start: 2020-08-06 | End: 2020-08-08

## 2020-08-06 RX ORDER — HYDRALAZINE HYDROCHLORIDE 20 MG/ML
10 INJECTION INTRAMUSCULAR; INTRAVENOUS EVERY 4 HOURS PRN
Status: DISCONTINUED | OUTPATIENT
Start: 2020-08-06 | End: 2020-08-10 | Stop reason: HOSPADM

## 2020-08-06 RX ORDER — CLONIDINE HYDROCHLORIDE 0.1 MG/1
0.1 TABLET ORAL DAILY PRN
Status: DISCONTINUED | OUTPATIENT
Start: 2020-08-06 | End: 2020-08-09

## 2020-08-06 RX ADMIN — ALVIMOPAN 12 MG: 12 CAPSULE ORAL at 11:50

## 2020-08-06 RX ADMIN — SODIUM CHLORIDE, POTASSIUM CHLORIDE, SODIUM LACTATE AND CALCIUM CHLORIDE: 600; 310; 30; 20 INJECTION, SOLUTION INTRAVENOUS at 09:36

## 2020-08-06 RX ADMIN — HYDROMORPHONE HYDROCHLORIDE 0.3 MG: 1 INJECTION, SOLUTION INTRAMUSCULAR; INTRAVENOUS; SUBCUTANEOUS at 10:04

## 2020-08-06 RX ADMIN — ACETAMINOPHEN 975 MG: 325 TABLET, FILM COATED ORAL at 06:25

## 2020-08-06 RX ADMIN — ONDANSETRON 4 MG: 2 INJECTION INTRAMUSCULAR; INTRAVENOUS at 14:24

## 2020-08-06 RX ADMIN — Medication 1 SPRAY: at 06:23

## 2020-08-06 RX ADMIN — METRONIDAZOLE 500 MG: 500 INJECTION, SOLUTION INTRAVENOUS at 14:31

## 2020-08-06 RX ADMIN — METRONIDAZOLE 500 MG: 500 INJECTION, SOLUTION INTRAVENOUS at 06:23

## 2020-08-06 RX ADMIN — HYDRALAZINE HYDROCHLORIDE 10 MG: 20 INJECTION INTRAMUSCULAR; INTRAVENOUS at 14:23

## 2020-08-06 RX ADMIN — CIPROFLOXACIN 400 MG: 2 INJECTION, SOLUTION INTRAVENOUS at 12:35

## 2020-08-06 RX ADMIN — ALVIMOPAN 12 MG: 12 CAPSULE ORAL at 21:46

## 2020-08-06 RX ADMIN — ENALAPRILAT 1.25 MG: 1.25 INJECTION INTRAVENOUS at 21:45

## 2020-08-06 RX ADMIN — ENOXAPARIN SODIUM 40 MG: 40 INJECTION SUBCUTANEOUS at 12:48

## 2020-08-06 ASSESSMENT — ACTIVITIES OF DAILY LIVING (ADL)
ADLS_ACUITY_SCORE: 17
ADLS_ACUITY_SCORE: 18
ADLS_ACUITY_SCORE: 17
ADLS_ACUITY_SCORE: 18
ADLS_ACUITY_SCORE: 18
ADLS_ACUITY_SCORE: 19

## 2020-08-06 NOTE — ANESTHESIA POSTPROCEDURE EVALUATION
Patient: Briseyda Garcia    Procedure(s):  Laparoscopic assisted right colectomy  with transanal endoscopic microsurgery    Diagnosis:History of colon polyps [Z86.010]  Rectal polyp [K62.1]  Diagnosis Additional Information: No value filed.    Anesthesia Type:  General    Note:  Anesthesia Post Evaluation    Patient location during evaluation: PACU  Patient participation: Able to fully participate in evaluation  Level of consciousness: awake and alert  Pain management: adequate  Airway patency: patent  Cardiovascular status: acceptable  Respiratory status: acceptable  Hydration status: acceptable  PONV: none     Anesthetic complications: None          Last vitals:  Vitals:    08/05/20 2044 08/05/20 2150 08/05/20 2333   BP: 130/67 113/65 137/70   Pulse:  58 70   Resp: 14 13 13   Temp:  97.5  F (36.4  C) 97.4  F (36.3  C)   SpO2: 96% 97% 95%         Electronically Signed By: Kamar Acosta MD  August 5, 2020  11:49 PM

## 2020-08-06 NOTE — OP NOTE
Procedure Date: 08/05/2020      PREOPERATIVE DIAGNOSIS:  Large rectal polyp and large right colon polyp.      PROCEDURE:  Transanal endoscopic microsurgical resection of the large rectal polyp and a laparoscopic-assisted right hemicolectomy.      SURGEON:  Ngoc Woods MD      COSURGEON:  Monica Lennon for the [facility] MD      ASSISTANT:  Liss Bustos MD, [facility] Colorectal Surgery Fellow.      ANESTHESIA:  General endotracheal anesthesia plus a TAP block done laparoscopically and a perianal block.      INDICATIONS:  Briseyda is a 70-year-old woman who did a colonoscopy in 09/2019 for rectal bleeding.  She was found to have a 3.5 cm polyp in the ascending colon on a fold and a 2 cm distal rectal polyp that was somewhat pedunculated.  We planned to do a transanal endoscopic microsurgery resection and right hemicolectomy.  She did have an ultrasound of the rectum polyp back in January and it was noted to be 2 x 2 cm wound without any suggestion for malignancy or lymphadenopathy.      She was briefed on the risks, benefits and alternatives of each of these procedures.  Risks including bleeding, infection, alteration of bowel control, anastomotic leak that might require reoperation or stoma.  Other risks associated with major abdominal surgery and general anesthesia including but not limited to DVT, PE, heart attack, stroke, urinary tract infection, other cardiopulmonary events and death.  We discussed that she might have a malignancy here given these large polyps despite the benign biopsies.  She understood and wished to proceed.  She did have extensive visits with her cardiologist as she does have somewhat brittle hypertension but was cleared for the procedure.      INTRAOPERATIVE FINDINGS:  There was a large distal rectal polyp encompassing the anterior right side 1/3 of the very distal rectum.  The suture line was really at the dentate line.  Other findings included a roughly 3.5-4 cm  lobulated right colon polyp.  No other lesions were noted.      DESCRIPTION OF PROCEDURE:  After informed consent was obtained, the patient was taken to the operating room, positioned on the cart and was intubated.  She was then placed in the left lateral decubitus position and a time-out was undertaken.  The rigid proctoscope was used and I confirmed the location in the right anterior, but the bulk of it seemed to be anterior, which prompted us to position her in the prone jackknife position.  She was carefully positioned on the bed, on the split leg table, on hip roll in a prone position and comfortably padded.  Her legs were secured to the split leg table and they were .  She was secured to the bed.  The perianal region was taped, prepped and draped in the usual fashion.  After appropriate timeout, I began by gently dilating the anal canal, beginning with a rectal examination, then the Alba bivalve, then the obturator of the operating scope, and then the scope itself.  The scope was then secured with the Carlo arm and we were then able to gonzalo out a perimeter of about 0.5 cm circumferentially.  This was a bit challenging distally as this was right down to the dentate line.  I then used electrocautery to elevate the distal flap up off the sphincter in a more distal portion of the polyp and then was able to elevate a full thickness resection laterally and proximally.  Worked my way around circumferentially using the Harmonic.  With each part of the dissection, we did check and palpate that we were off of the vagina and were careful to stay free from that plane.  Once we had removed the specimen.  This was pinned out and it did look like the distal margin was close, but given what we could see with the scope, I do believe that shrunk up with electrocautery.  We then given the very distal nature of this, placed a single stitch on the proximal edge of the surgery site and removed the operating scope and  closed this with interrupted 3-0 Vicryl, transanally using a Raygoza retractor.  At the completion of this, things were nicely hemostatic and well opposed without undue tension.  We did rinse out the rectum and performed a proctoscope to ensure the lumen was widely patent, which in fact it was.  We then injected 30 mL of 0.25% Marcaine into the perianal and deeper tissues to perform a block.      We then rolled her on her back and positioned her in the supine position on an additional operating room table.  Her right arm was positioned on an arm board and the left arm was tucked at her side, and she was secured to the bed with wide tape across her chest and a strap over her lower extremity.  Her abdomen was prepped and draped in the usual fashion and we began by making a 1 cm incision above the umbilicus.  Dissection was carried down through the fascia.  Peritoneal cavity was entered without difficulty.  We then placed a balloon port and could see that there was some omentum adherent to the anterior abdominal wall and really all along the right colon.  We did a TAP block to begin by injecting 7.5 mL of 0.25% Marcaine into each of the 4 quadrants.  Two 5 mm ports were placed in the left abdomen and one in the epigastric region.  We then began by taking down these adhesions of the omentum to the anterior abdominal wall, which was somewhat tedious.  We then were able to take the omentum off of the ascending colon.  In doing so, we could then identify the vascular pedicle of the ileocolic.  This was retracted inferior and laterally placed on tension and an incision was made just inferior to this.  We then were able to elevate up the mesentery up off of the retroperitoneum.  Unfortunately, she had quite a thickened mesentery in this area and given her angulation, we were unable to see the duodenum under the vascular pedicle.  Therefore, I elected not to divide the vessels here and proceed with the lateral approach.  We  then elevated up the cecum and then divided and incised the lateral attachments of the small bowel and the cecum and then carried our dissection up to and around the hepatic flexure.  Again, there were several interloop adhesions in slight angulation.  It is unclear whether she had had some sort of trauma or other infection within the abdomen, such as PID causing these adhesions. It was somewhat more tedious than one would expect.  Once we had freed up the right colon mesentery up off of the duodenum as well as the transverse colon mesentery, we could then more clearly elevate the mesentery up off the retroperitoneum and could comfortably divide the vascular pedicle under direct vision clearly staying away from the duodenum.  This was divided with numerous fires of the LigaSure and had excellent hemostasis.  Once we had done that, we felt we had good mobilization of the small bowel up to the transverse colon in order to take out the large polyp that was just across from the ileocecal valve.  We grasped the appendix with a locking grasper and opened a roughly 4 cm supraumbilical incision extending from our prior Madelaine port.  A 5-9 Doyle was placed and we were easily able to bring out the specimen and line up for side-to-side anastomosis.  We did make a window in the mesentery of the small bowel just about 5 cm proximal to the ileocecal valve and divided the remaining mesentery towards the vascular pedicle.  Similarly, we identified a portion of the transverse colon that appeared well perfused and divided the remaining mesentery leaving the right branch of the middle colic intact.  We then created a side-to-side functional anastomosis using a BISHOP-75 blue load.  We then offset the staple lines and reapproximated the common enterotomy with a TA-90 and divided this.  We oversewed several areas that were bleeding from the cut edge of the staple line and this was nicely hemostatic at the completion of this.  The corners,  crotch and crossing staple lines were oversewn and dunked.  The anastomosis appeared widely patent, well perfused, tension free and hemostatic and this was dropped back into the abdomen.      We switched to clean gloves and clean instruments and irrigated with a liter of warm saline and found the effluent to be clear.  The omentum was brought down over the anterior abdominal contents and the fascia was reapproximated with 0 looped PDS.  Of note, we had removed the three 5 mm ports and area appeared hemostatic.  We then reapproximated the skin with 4-0 Monocryl and Exofin.  She was brought to the recovery room intubated in stable condition, but subsequently was extubated without difficulty.  Sponge and needle counts were correct at the end of the case.      SPECIMENS:  Distal rectal polyp and the right colon terminal ileum, and appendix.      ESTIMATED BLOOD LOSS:  20 mL      COMPLICATIONS:  No immediate complications.         ANY DIA MD             D: 2020   T: 2020   MT: ANSON      Name:     KEYON MARTIN   MRN:      [id number]        Account:        [id number]   :      1950           Procedure Date: 2020      Document: [id number]       cc: [facility]        Dino Banks MD

## 2020-08-06 NOTE — PROGRESS NOTES
08/06/20 1450   Quick Adds   Type of Visit Initial PT Evaluation   Living Environment   Lives With spouse   Living Arrangements house   Home Accessibility stairs to enter home   Number of Stairs, Main Entrance 2   Stair Railings, Main Entrance none   Transportation Anticipated family or friend will provide   Self-Care   Usual Activity Tolerance moderate   Current Activity Tolerance fair   Regular Exercise No   Equipment Currently Used at Home walker, rolling   Functional Level Prior   Ambulation 1-->assistive equipment   Transferring 1-->assistive equipment   Toileting 0-->independent   Bathing 1-->assistive equipment   Fall history within last six months no   Which of the above functional risks had a recent onset or change? ambulation;transferring;toileting;bathing;dressing   General Information   Onset of Illness/Injury or Date of Surgery - Date 08/05/20   Referring Physician Ngoc Woods MD    Patient/Family Goals Statement Pt would like to discharge to home, spouse would like discharge to TCU   Pertinent History of Current Problem (include personal factors and/or comorbidities that impact the POC) Briseyda Garcia is a 70 year old female with HTN, hypertrophic obstructive cardiomyopathy, and history of CVA in 1970 with resulting left hemiparesis admitted on 8/5/2020 for an elective rectal polypectomy and lap-assisted right hemicolectomy with Dr Woods.   Precautions/Limitations fall precautions;abdominal precautions   Weight-Bearing Status - LLE full weight-bearing   Weight-Bearing Status - RLE full weight-bearing   General Observations Pt supine upon initiation, feeling nauseated, but agreeable to session.    Cognitive Status Examination   Orientation orientation to person, place and time   Level of Consciousness alert   Follows Commands and Answers Questions 100% of the time   Personal Safety and Judgment intact   Memory intact   Pain Assessment   Patient Currently in Pain Yes, see Vital Sign  "flowsheet   Integumentary/Edema   Integumentary/Edema Comments not visualized   Posture    Posture Forward head position;Protracted shoulders   Range of Motion (ROM)   ROM Comment WFL   Strength   Strength Comments Decreased functional strength as seen by pt's need for assist with mobility   Bed Mobility   Bed Mobility Comments sit<>supine Tim   Transfer Skills   Transfer Comments unable to progress to transfers   Gait   Gait Comments unable to progress to gait   Balance   Balance Comments requires B UE support for safe balance, impaired balance at baseline   Sensory Examination   Sensory Perception no deficits were identified   Coordination   Coordination no deficits were identified   Muscle Tone   Muscle Tone no deficits were identified   Modality Interventions   Planned Modality Interventions Cryotherapy   Planned Modality Interventions Comments Ice PRN   General Therapy Interventions   Planned Therapy Interventions balance training;bed mobility training;gait training;ROM;strengthening;stretching;transfer training;home program guidelines;progressive activity/exercise   Clinical Impression   Criteria for Skilled Therapeutic Intervention yes, treatment indicated   PT Diagnosis Impaired functional mobility   Influenced by the following impairments Pain, weakness, balance, nausea   Functional limitations due to impairments Difficulty with bed mobility, transfers, ambulation, stairs   Clinical Presentation Stable/Uncomplicated   Clinical Presentation Rationale medically clear POC   Clinical Decision Making (Complexity) Low complexity   Therapy Frequency Daily   Predicted Duration of Therapy Intervention (days/wks) 6 days   Anticipated Discharge Disposition Transitional Care Facility   Risk & Benefits of therapy have been explained Yes   Patient, Family & other staff in agreement with plan of care Yes   [facility] AM-PAC TM \"6 Clicks\"   2016, Trustees of [facility], under license to [employer].  All " "rights reserved.   6 Clicks Short Forms Basic Mobility Inpatient Short Form   [facility] AM-PAC  \"6 Clicks\" V.2 Basic Mobility Inpatient Short Form   1. Turning from your back to your side while in a flat bed without using bedrails? 3 - A Little   2. Moving from lying on your back to sitting on the side of a flat bed without using bedrails? 3 - A Little   3. Moving to and from a bed to a chair (including a wheelchair)? 3 - A Little   4. Standing up from a chair using your arms (e.g., wheelchair, or bedside chair)? 3 - A Little   5. To walk in hospital room? 3 - A Little   6. Climbing 3-5 steps with a railing? 2 - A Lot   Basic Mobility Raw Score (Score out of 24.Lower scores equate to lower levels of function) 17   Total Evaluation Time   Total Evaluation Time (Minutes) 8     "

## 2020-08-06 NOTE — PROGRESS NOTES
"Colon & Rectal Surgery Progress Note             Interval History:   Postop Day #: 1 Lap right and TEM  No nausea, pain better controlled. tolerating some clears                Medications:   I have reviewed this patient's current medications               Physical Exam:   Blood pressure (!) 153/77, pulse 66, temperature 97.6  F (36.4  C), temperature source Oral, resp. rate 15, height 1.575 m (5' 2\"), weight 91.5 kg (201 lb 11.5 oz), SpO2 95 %.    Intake/Output Summary (Last 24 hours) at 8/6/2020 0658  Last data filed at 8/6/2020 0600  Gross per 24 hour   Intake 4420 ml   Output 1595 ml   Net 2825 ml     GEN:  alert  ABD:  Soft, incisions CDI         Data:        No results found for: NA No results found for: CHLORIDE No results found for: BUN   No results found for: POTASSIUM No results found for: CO2 Lab Results   Component Value Date    CR 0.72 08/05/2020        No results found for: HGB  Lab Results   Component Value Date     08/05/2020     No results found for: WBC         Assessment and Plan:   Plan  Discontinue rouse  Encourage ambulation, base line Left sided weakness. PT ordered.  Clears for AM likely advance for lunch if doing well  Minimize narcotics as possible.  lovenox for now, not likely to need at discharge unless path comes back with cancer.     Ngoc Woods MD  [facility]  Office Phone # [phone]    "

## 2020-08-06 NOTE — PLAN OF CARE
Pertinent assessments: POD #1. VSS on 2L. Afebrile. Capno monitoring. Emesis x2. Refusing antiemetic administration. Abd incisions and WNLs. Zamorano patent. Tele: SR. LS diminished. BS hypoactive. No flatus. Denies need for PRN pain meds. Ordered clears but avoiding input due to emesis. Clonidine patch to Right chest.   Major Shift Events: None  Treatment Plan: Pain management, advance diet, return to bowel function    Discharge Readiness: Medically active  Expected Discharge Date: TBD  Discharge Disposition: Home with Self care  Barriers/Criteria for discharge None
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
## 2020-08-06 NOTE — PLAN OF CARE
PT: Orders received. PT evaluation completed and treatment initiated. Pt reports living in a house with 2 steps to enter. Pt's spouse has been assisting with stair management at home. Pt ambulates with a FWW. FWW does not fit in the bathroom with the grab bars and walk in shower, so pt has been utilizing vanity for support when mobilizing in this bathroom.     Discharge Planner PT   Patient plan for discharge: Pt wants home, spouse wants TCU  Current status: Pt supine upon initiation, agreeable to session. Pt completes sit<>supine with cues for log roll technique and Tim. Pt able to scoot toward EOB with SBA and significant time. Pt reports sudden onset of nausea and dizziness upon scooting EOB and requests to lay back down. No emesis. Pt assisted back to sidelying, extra time taken for positioning. Pt's spouse discusses his concerns with pts mobility outside the room. Pt's spouse is concerned that her mobility was declining before being admitted, and fears it has now taken a major step-back. Did discuss that this session was greatly limited by her nausea and dizziness, and that she currently is requiring minimal assist for mobility. Pt's spouse reports that he would be much more comfortable with a TCU discharge and has been speaking to TCUs already.   Barriers to return to prior living situation: Pain, nausea, stairs to enter home, weakness, deconditioning, abd precautions  Recommendations for discharge: TCU  Rationale for recommendations: Pt is not currently at baseline for mobility, and is unsafe to discharge home. With continued PT, both IP and after discharge, pt is likely to obtain mobility goals.        Entered by: Kirstie Lilly 08/06/2020 2:51 PM

## 2020-08-06 NOTE — PLAN OF CARE
To Do:  End of Shift Summary  For vital signs and complete assessments, please see documentation flowsheets.     Pertinent assessments: abdomin rounded hypoactive bowel sounds, no flatus present, pt on clear liquid diet but advised to not have fluids due to n/v, abdominal incisions clean, dry and intact, telementary applied sinus rhythm noted, attemped to get pt out of bed, able to get pt to dangle feet only due to n/v, rouse in place, not removed due to poor ambulation ability, B/P 191/74 IV medication administrated, decreased O2 to 2L.  Major Shift Events: Pt c/o nausea, 3 episodes of emesis  Treatment Plan: Pain management, advance diet, return to bowel function    Discharge Readiness: Medically active  Expected Discharge Date: TBD  Discharge Disposition: Home with Self care  Barriers/Criteria for discharge None

## 2020-08-06 NOTE — PLAN OF CARE
Hypo BS, lungs clear, incisions dermabond, bruised. 950 urine output. Denies nausea. Mild pain treated with tylenol.   C/o dry mouth, ice, water and biotene given. Awake a majority of the night.

## 2020-08-07 ENCOUNTER — APPOINTMENT (OUTPATIENT)
Dept: PHYSICAL THERAPY | Facility: CLINIC | Age: 70
DRG: 330 | End: 2020-08-07
Attending: COLON & RECTAL SURGERY
Payer: COMMERCIAL

## 2020-08-07 LAB
ANION GAP SERPL CALCULATED.3IONS-SCNC: 7 MMOL/L (ref 3–14)
BUN SERPL-MCNC: 6 MG/DL (ref 7–30)
CALCIUM SERPL-MCNC: 8.6 MG/DL (ref 8.5–10.1)
CHLORIDE SERPL-SCNC: 105 MMOL/L (ref 94–109)
CO2 SERPL-SCNC: 28 MMOL/L (ref 20–32)
CREAT SERPL-MCNC: 0.66 MG/DL (ref 0.52–1.04)
GFR SERPL CREATININE-BSD FRML MDRD: 89 ML/MIN/{1.73_M2}
GLUCOSE SERPL-MCNC: 115 MG/DL (ref 70–99)
HGB BLD-MCNC: 14 G/DL (ref 11.7–15.7)
POTASSIUM SERPL-SCNC: 3.4 MMOL/L (ref 3.4–5.3)
SODIUM SERPL-SCNC: 140 MMOL/L (ref 133–144)

## 2020-08-07 PROCEDURE — 99232 SBSQ HOSP IP/OBS MODERATE 35: CPT | Performed by: INTERNAL MEDICINE

## 2020-08-07 PROCEDURE — 80048 BASIC METABOLIC PNL TOTAL CA: CPT | Performed by: PHYSICIAN ASSISTANT

## 2020-08-07 PROCEDURE — 97530 THERAPEUTIC ACTIVITIES: CPT | Mod: GP

## 2020-08-07 PROCEDURE — 25000128 H RX IP 250 OP 636: Performed by: COLON & RECTAL SURGERY

## 2020-08-07 PROCEDURE — 12000000 ZZH R&B MED SURG/OB

## 2020-08-07 PROCEDURE — 25000125 ZZHC RX 250: Performed by: INTERNAL MEDICINE

## 2020-08-07 PROCEDURE — 25000132 ZZH RX MED GY IP 250 OP 250 PS 637: Performed by: COLON & RECTAL SURGERY

## 2020-08-07 PROCEDURE — 97116 GAIT TRAINING THERAPY: CPT | Mod: GP

## 2020-08-07 PROCEDURE — 25800030 ZZH RX IP 258 OP 636: Performed by: COLON & RECTAL SURGERY

## 2020-08-07 PROCEDURE — 25000132 ZZH RX MED GY IP 250 OP 250 PS 637: Performed by: PHYSICIAN ASSISTANT

## 2020-08-07 PROCEDURE — 36415 COLL VENOUS BLD VENIPUNCTURE: CPT | Performed by: PHYSICIAN ASSISTANT

## 2020-08-07 PROCEDURE — 85018 HEMOGLOBIN: CPT | Performed by: PHYSICIAN ASSISTANT

## 2020-08-07 RX ADMIN — ACETAMINOPHEN 975 MG: 325 TABLET, FILM COATED ORAL at 06:33

## 2020-08-07 RX ADMIN — ALVIMOPAN 12 MG: 12 CAPSULE ORAL at 08:08

## 2020-08-07 RX ADMIN — SODIUM CHLORIDE, POTASSIUM CHLORIDE, SODIUM LACTATE AND CALCIUM CHLORIDE: 600; 310; 30; 20 INJECTION, SOLUTION INTRAVENOUS at 00:06

## 2020-08-07 RX ADMIN — ENOXAPARIN SODIUM 40 MG: 40 INJECTION SUBCUTANEOUS at 13:37

## 2020-08-07 RX ADMIN — LISINOPRIL 20 MG: 20 TABLET ORAL at 08:02

## 2020-08-07 RX ADMIN — ALVIMOPAN 12 MG: 12 CAPSULE ORAL at 21:30

## 2020-08-07 RX ADMIN — ENALAPRILAT 1.25 MG: 1.25 INJECTION INTRAVENOUS at 07:52

## 2020-08-07 RX ADMIN — ACETAMINOPHEN 975 MG: 325 TABLET, FILM COATED ORAL at 21:30

## 2020-08-07 RX ADMIN — ENALAPRILAT 1.25 MG: 1.25 INJECTION INTRAVENOUS at 02:52

## 2020-08-07 ASSESSMENT — ACTIVITIES OF DAILY LIVING (ADL)
ADLS_ACUITY_SCORE: 17
ADLS_ACUITY_SCORE: 16
ADLS_ACUITY_SCORE: 20
ADLS_ACUITY_SCORE: 16
ADLS_ACUITY_SCORE: 20
ADLS_ACUITY_SCORE: 20

## 2020-08-07 NOTE — PLAN OF CARE
PT is A&Ox4. On 2 L O2. BP elevated, Vasotec administered per plan of care. Will continue to monitor. PT got up with A-2, gait belt, walker, and abdominal binder. Sat in chair for a little. PT taking sips of clear liquids and ice chips. IVF infusing. Declining interventions for nausea. Incisions open to air. CDI. Zamorano catheter removed at 0550. Due to void. Plan of care reviewed with patient.

## 2020-08-07 NOTE — PLAN OF CARE
"AOx4, tolerating few ice chips and sips with continuous nausea and occasional emesis. Has dangled twice with PT, otherwise no ambulation since surgery. LR infusing at 75/hr. On 2L O2.   Pt has hypertrophic cardiomyopathy, BP has been increasing since surgery; however, she is against taking many anti-hypertensive meds. BP was up to 190/86 when she finally allowed me to give Vasotec. Says \"a SBP of 140 is low for her at home\" and she does not want it to get lower than that, in accordance to her cardiologist's advice.   Pt on Tele; sinus rhythm.   Complains of abdominal pain but refusing pain meds.   Zamorano in until POD2, decent output.   BS hypo, not passing gas. Incisions are open to air, CDI.   "

## 2020-08-07 NOTE — PLAN OF CARE
To Do:  End of Shift Summary  For vital signs and complete assessments, please see documentation flowsheets.     Pertinent assessments: POD #2. Capno discontinued.  Zamorano removed. Tele: SR with slight ST depression. No flatus. No BM. Advanced to full liquid diet. Urinating in toilet. . 1x straight cath. O2 weened to 1L. IV access removed due to malfunctioning. B/P 208/109 medication interventions completed and effective.  Major Shift Events: Ambulated 3x. Urinated in Toilet.   Treatment Plan: Pain management, advance diet, return to bowel function    Discharge Readiness: Medically active  Expected Discharge Date: TBD  Discharge Disposition: Home with Self care  Barriers/Criteria for discharge None

## 2020-08-07 NOTE — CONSULTS
"CLINICAL NUTRITION SERVICES  -  ASSESSMENT NOTE      MALNUTRITION:  % Weight Loss:  None noted  % Intake:  Decreased intake does not meet criteria for malnutrition   Subcutaneous Fat Loss:  Upper arm region mild to moderate depletion --> not using as indicator with only 1 region present   Muscle Loss:  Temporal region mild depletion, Clavicle bone region mild depletion and Acromion bone region mild depletion  Fluid Retention:  None noted or documented    Malnutrition Diagnosis: Patient does not meet two of the above criteria necessary for diagnosing malnutrition          REASON FOR ASSESSMENT  Briseyda Garcia is a 70 year old female seen by Registered Dietitian for Provider Order - Nutrition Education - low fiber ed    PMH of: Colon polyp, HTN, CVA.    Admit 2/2: Planned admit with R hemicolectomy 8/05/2020.      NUTRITION HISTORY  - Information obtained from patient and chart.  - Diet at home: Regular.  Has never been on a low fiber diet before or received education.  - Usual intakes: Meals TID.  Tends to choose high-fiber foods including fruits/vegetables, beans, and chunky vegetables.  - Barriers to PO intakes: Denied PTA.  Was eating at baseline.  - Use of oral supplements: None.  - Chewing/swallowing issues: Denied.  - Allergies: NKFA.      CURRENT NUTRITION ORDERS  Diet Order:     Clears    Current Intake/Tolerance:  3 emesis episodes yesterday.  Remains on clears post-op. CRS note from this AM indicates will advance to fulls.      NUTRITION FOCUSED PHYSICAL ASSESSMENT FOR DIAGNOSING MALNUTRITION)  Yes         Observed:    Muscle wasting (refer to documentation in Malnutrition section) and Subcutaneous fat loss (refer to documentation in Malnutrition section)    Obtained from Chart/Interdisciplinary Team:  - No documentation of skin issues   - Stooling patterns reviewed, no documented BM post-op    ANTHROPOMETRICS  Height: 5' 2\"  Weight: 201 lbs 11.53 oz  Body mass index is 36.9 kg/m .  Weight Status:  Obesity " Grade II BMI 35-39.9  Weight History:  Wt Readings from Last 10 Encounters:   08/05/20 91.5 kg (201 lb 11.5 oz)   03/29/19 88.3 kg (194 lb 10.7 oz)   03/29/19 88.3 kg (194 lb 11.2 oz)   02/21/19 88 kg (193 lb 14.4 oz)     - No wt loss when compared to at least 2/2019.  Confirmed by patient.    LABS  Labs reviewed:  Electrolytes  Potassium (mmol/L)   Date Value   08/07/2020 3.4    Blood Glucose  Glucose (mg/dL)   Date Value   08/07/2020 115 (H)    Inflammatory Markers  No results found for: CRP, WBC, ALBUMIN   Sodium (mmol/L)   Date Value   08/07/2020 140    Renal  Urea Nitrogen (mg/dL)   Date Value   08/07/2020 6 (L)     Creatinine (mg/dL)   Date Value   08/07/2020 0.66   08/05/2020 0.72     Additional  No results found for: TRIG, URINEKETONE     B/P: 208/109, T: 98.3, P: 80, R: 20      MEDICATIONS  Medications reviewed:    acetaminophen  975 mg Oral Q8H     alvimopan  12 mg Oral BID     cloNIDine   Transdermal Q8H     cloNIDine  1 patch Transdermal Weekly     enalaprilat  1.25 mg Intravenous Q6H     enoxaparin ANTICOAGULANT  40 mg Subcutaneous Q24H     lisinopril  20 mg Oral Daily     sodium chloride (PF)  3 mL Intracatheter Q8H        lactated ringers 75 mL/hr at 08/07/20 0006          ASSESSED NUTRITION NEEDS PER APPROVED PRACTICE GUIDELINES:    Dosing Weight 92 kg  Estimated Energy Needs: >/=1840 kcals (>/=20 Kcal/Kg)  Justification: maintenance  Estimated Protein Needs: >/=92 grams protein (>/=1 g pro/Kg)  Justification: post-op and preservation of lean body mass  Estimated Fluid Needs: per MD      NUTRITION DIAGNOSIS:  Inadequate oral intake related to altered GI function post-op as evidenced by remains on clears since admit.     NUTRITION INTERVENTIONS  Recommendations / Nutrition Prescription  Diet per CRS team.       Implementation  Nutrition education: Provided education on low fiber:    Assessed learning needs, learning preferences, and willingness to learn    Nutrition Education (Content):  a) Provided  "handout \"low fiber nutrition therapy\"  b) Discussed common foods high in fiber to avoid, appropriate substitutes    Nutrition Education (Application):  a) Discussed eating habits and recommended alternative food choices    Patient verbalizes understanding of diet by stating need to avoid uncooked vegetables short term.    Anticipate good compliance    Diet Education - refer to Education Flowsheet      Collaboration and Referral of Nutrition care: Discussed POC with team during rounds and education with RN.    Nutrition Goals  Diet advancement past clears w/in 24 hrs.      MONITORING AND EVALUATION:  Progress towards goals will be monitored and evaluated per protocol and Practice Guidelines          Myriam Bauer RDN, LD  Clinical Dietitian  3rd floor/ICU: [phone]  All other floors: [phone]  Weekend/holiday: [phone]  "

## 2020-08-07 NOTE — PLAN OF CARE
"  Discharge Planner PT   Patient plan for discharge: Patient and spouse would like TCU, concerned about patient needing to negotiate stairs, walk without walker in bathroom  Current status: Patient supine upon arrival, transferred to sitting at EOB through log roll with min A.  Patient required max A to don abdominal binder in sitting.  Patient transferred to standing with 2WW and CGA.  Patient reported minimal dizziness and no nausea.  Patient amb 60 feet with 2WW and CGA.  Patient with slow gait speed, heavy reliance on UE support at walker, decreased left LE strength noted.  Patient reports LEs continue to feel \"weak\" but improved since yesterday.  Returned to supine with mod A at LEs.  Patient was on 2L NC at rest at beginning of session; with activity (sitting/standing/walking) patient able to maintain O2 sats above 91% on RA.  In reclined supine, patient with O2 sats between 89-91%, 2L NC returned at end of session as patient was in supine.  Barriers to return to prior living situation: Pain, nausea, stairs to enter home, weakness, deconditioning, abdominal precautions  Recommendations for discharge: TCU  Rationale for recommendations: Pt is not currently at baseline for mobility, and is unsafe to discharge home. With continued PT, both IP and after discharge, pt is likely to obtain mobility goals.        Entered by: Maria Del Rosario Gallardo 08/07/2020 12:18 PM       "

## 2020-08-07 NOTE — PROGRESS NOTES
Your information has been submitted on August 07th, 2020 at 03:52:35 PM CDT. The confirmation number is [id number]

## 2020-08-07 NOTE — CONSULTS
Care Transition Initial Assessment - SW     Met with: Patient and Family    Active Problems:    Adenomatous polyp of ascending colon       DATA  Lives With: spouse   Living Arrangements: house  Quality of Family Relationships: helpful, involved, supportive  Description of Support System: Supportive, Involved  Who is your support system?: , Children  Support Assessment: Adequate family and caregiver support, Adequate social supports. Pt's spouse works one day a week, otherwise home to support pt   PT is not open to any out side support   Identified issues/concerns regarding health management: Pt has H/O CVA from her 20's. Pt followed by Surgery    @   Resources List: Skilled Nursing Facility  PT and family provided SW with contact for [facility].. They have toured and researched facility for TCU needs prior to surgery       Quality of Family Relationships: helpful, involved, supportive  Transportation Anticipated: family or friend will provide  Pt is able to drive, anticipate family will transport   ASSESSMENT  Cognitive Status:  awake, alert and oriented  Concerns to be addressed: Met with pt and spouse.. They are aware or recommendations for TCU.. PT has never been to rehab.. Is needing support with staff, walker and needs o2 for activity. This is new for pt. Pt has 2 steps to get into her home,. Has been independent with all ADL's prior to admission  Good family support to return home following Rehab      PLAN  Referrals sent for TCU.. Called and left VM message for admissions  Starting Prior auth     Corinne White [facility]  Inpatient Care Coordination   [phone]  [facility]       Addendum    Clinically pt has been accepted for TCU for Sunday [facility].  Waiting for [employer] Medicare ins auth

## 2020-08-07 NOTE — PROGRESS NOTES
[facility]  Consult Progress Note - Hospitalist Service     Date of Admission:  8/5/2020  Consult Requested by: Dr Woods  Reason for Consult:  Hospitalist consult     Assessment & Plan   Briseyda Garcia is a 70 year old female with HTN, hypertrophic obstructive cardiomyopathy, and history of CVA in 1970 with resulting left hemiparesis admitted on 8/5/2020 for an elective rectal polypectomy and lap-assisted right hemicolectomy with Dr Woods.  Hospitalists consulted for postoperative medical comanagement. Her blood pressure has historically been high, per her  she often lets this run as high as 190 systolic at home.  Added prn bp meds and resumed lisinopril and clonidine patch.     Rectal polyp s/p polypectomy and right hemicolectomy, POD#2  Colorectal Surgery managing.  Patient progressing.  Pathology pending.  EBL 20 mL.  PT following.     HOCM   Hypertension: labile.  She prefers to let this run high.  Encouraged med compliance.  Intolerance to numerous agents.  --Continue PTA clonidine patch and lisinopril with hold parameters.    --PRN Hydralazine and labetalol available.    --Needs to follow-up with Dr Aguilar of [facility] 3 months after procedure.    --Tele to monitor HR.       History of remote CVA with left residual hemiparesis  Occurred after trauma; not related to ASCVD or PAD.  Defer to CRS as to when can resume ASA 81.  May need home health care.       ______________________________________________________________________    Interval History:  Blood pressure continues to be labile, intermittently accepting bp meds  Did get lisinopril today  Otherwise feels ok.  No cardiopulmonary complaints.      Physical Exam   Vital Signs: Temp: 97.9  F (36.6  C) Temp src: Oral BP: (!) 183/95 Pulse: 80 Heart Rate: 72 Resp: 18 SpO2: 94 % O2 Device: Nasal cannula Oxygen Delivery: 1 LPM  Weight: 201 lbs 11.53 oz    GENERAL:  Pleasant, cooperative, alert. Well developed, well nourished.   HEENT: Normocephalic,  atraumatic.  Extra occular mm intact.  Sclera clear.   PULMONOLOGY: Clear to auscultation bilaterally anteriorly.  Capno on.    CARDIAC: Regular rate and rhythm.  No appreciated murmur.  Normal S1/S2.     ABDOMEN:  Post-op dressings in place.   MUSCULOSKELETAL:  Moving right side well; known left hemiplegia.    NEURO: Alert and oriented x3.  CN II-XII grossly intact and symmetric.      Data   Results for orders placed or performed during the hospital encounter of 08/05/20 (from the past 24 hour(s))   Hemoglobin   Result Value Ref Range    Hemoglobin 14.0 11.7 - 15.7 g/dL   Basic metabolic panel   Result Value Ref Range    Sodium 140 133 - 144 mmol/L    Potassium 3.4 3.4 - 5.3 mmol/L    Chloride 105 94 - 109 mmol/L    Carbon Dioxide 28 20 - 32 mmol/L    Anion Gap 7 3 - 14 mmol/L    Glucose 115 (H) 70 - 99 mg/dL    Urea Nitrogen 6 (L) 7 - 30 mg/dL    Creatinine 0.66 0.52 - 1.04 mg/dL    GFR Estimate 89 >60 mL/min/[1.73_m2]    GFR Estimate If Black >90 >60 mL/min/[1.73_m2]    Calcium 8.6 8.5 - 10.1 mg/dL

## 2020-08-07 NOTE — PROGRESS NOTES
"Colon & Rectal Surgery Progress Note             Interval History:   Postop Day #: 2 from TEM and lap right.  No nausea, tolerating clears. + HTN with dizziness.   Minimal pain. Voiding with good UOP.                Medications:   I have reviewed this patient's current medications               Physical Exam:   Blood pressure (!) 183/102, pulse 80, temperature 98.3  F (36.8  C), temperature source Oral, resp. rate 20, height 1.575 m (5' 2\"), weight 91.5 kg (201 lb 11.5 oz), SpO2 95 %.    Intake/Output Summary (Last 24 hours) at 8/7/2020 0919  Last data filed at 8/7/2020 0550  Gross per 24 hour   Intake 745 ml   Output 4350 ml   Net -3605 ml     GEN:  alert  ABD:  Soft, incisions with slight bruising. No signs of infection         Data:        Lab Results   Component Value Date     08/07/2020    Lab Results   Component Value Date    CHLORIDE 105 08/07/2020    Lab Results   Component Value Date    BUN 6 08/07/2020      Lab Results   Component Value Date    POTASSIUM 3.4 08/07/2020    Lab Results   Component Value Date    CO2 28 08/07/2020    Lab Results   Component Value Date    CR 0.66 08/07/2020    CR 0.72 08/05/2020        Lab Results   Component Value Date    HGB 14.0 08/07/2020     Lab Results   Component Value Date     08/05/2020     No results found for: WBC         Assessment and Plan:   Doing well  Advance to full liquids  Encourage ambulation  Hospitalist to help with dilated cardiomyopathy and HTN.  Path pending. Not likely to need Lovenox at discharge unless path shows cancer.  Await bowel function.       Ngoc Woods MD  [facility]  Office Phone # [phone]      Ngoc Woods MD  [facility]  Office Phone # [phone]    "

## 2020-08-08 ENCOUNTER — APPOINTMENT (OUTPATIENT)
Dept: PHYSICAL THERAPY | Facility: CLINIC | Age: 70
DRG: 330 | End: 2020-08-08
Attending: COLON & RECTAL SURGERY
Payer: COMMERCIAL

## 2020-08-08 LAB — PLATELET # BLD AUTO: 179 10E9/L (ref 150–450)

## 2020-08-08 PROCEDURE — 97116 GAIT TRAINING THERAPY: CPT | Mod: GP

## 2020-08-08 PROCEDURE — 25000132 ZZH RX MED GY IP 250 OP 250 PS 637: Performed by: INTERNAL MEDICINE

## 2020-08-08 PROCEDURE — 97530 THERAPEUTIC ACTIVITIES: CPT | Mod: GP

## 2020-08-08 PROCEDURE — 25000128 H RX IP 250 OP 636: Performed by: COLON & RECTAL SURGERY

## 2020-08-08 PROCEDURE — 99232 SBSQ HOSP IP/OBS MODERATE 35: CPT | Performed by: INTERNAL MEDICINE

## 2020-08-08 PROCEDURE — 85049 AUTOMATED PLATELET COUNT: CPT | Performed by: COLON & RECTAL SURGERY

## 2020-08-08 PROCEDURE — 12000000 ZZH R&B MED SURG/OB

## 2020-08-08 PROCEDURE — 36415 COLL VENOUS BLD VENIPUNCTURE: CPT | Performed by: COLON & RECTAL SURGERY

## 2020-08-08 PROCEDURE — 25000132 ZZH RX MED GY IP 250 OP 250 PS 637: Performed by: COLON & RECTAL SURGERY

## 2020-08-08 RX ADMIN — ENOXAPARIN SODIUM 40 MG: 40 INJECTION SUBCUTANEOUS at 12:20

## 2020-08-08 RX ADMIN — ACETAMINOPHEN 975 MG: 325 TABLET, FILM COATED ORAL at 15:31

## 2020-08-08 RX ADMIN — LISINOPRIL 20 MG: 20 TABLET ORAL at 09:13

## 2020-08-08 RX ADMIN — ALVIMOPAN 12 MG: 12 CAPSULE ORAL at 09:13

## 2020-08-08 RX ADMIN — ACETAMINOPHEN 975 MG: 325 TABLET, FILM COATED ORAL at 06:26

## 2020-08-08 ASSESSMENT — ACTIVITIES OF DAILY LIVING (ADL)
DRESS: 0-->INDEPENDENT
WHICH_OF_THE_ABOVE_FUNCTIONAL_RISKS_HAD_A_RECENT_ONSET_OR_CHANGE?: AMBULATION;TRANSFERRING
RETIRED_COMMUNICATION: 0-->UNDERSTANDS/COMMUNICATES WITHOUT DIFFICULTY
FALL_HISTORY_WITHIN_LAST_SIX_MONTHS: NO
SWALLOWING: 0-->SWALLOWS FOODS/LIQUIDS WITHOUT DIFFICULTY
COGNITION: 0 - NO COGNITION ISSUES REPORTED
TOILETING: 0-->INDEPENDENT
ADLS_ACUITY_SCORE: 21
BATHING: 1-->ASSISTIVE EQUIPMENT
ADLS_ACUITY_SCORE: 21
ADLS_ACUITY_SCORE: 22
AMBULATION: 1-->ASSISTIVE EQUIPMENT
ADLS_ACUITY_SCORE: 21
RETIRED_EATING: 0-->INDEPENDENT
TRANSFERRING: 1-->ASSISTIVE EQUIPMENT
ADLS_ACUITY_SCORE: 21
ADLS_ACUITY_SCORE: 21

## 2020-08-08 NOTE — PROGRESS NOTES
Discharge Planner   Discharge Plans in progress: PT has been clinically accepted for TCU for Sun/Mon. SW also has BSBC auth for TCU Auth [id number]    Barriers to discharge plan: none    Follow up plan:      08/08/20 1500   [facility] ([place]) [phone], Fax: [phone]   PAS Number [id number]   Family can transport     Corinne White [facility]  Inpatient Care Coordination   [phone]  [facility]          Entered by: Corinne C. White 08/08/2020 3:36 PM

## 2020-08-08 NOTE — PROGRESS NOTES
"Colon & Rectal Surgery Progress Note             Interval History:   Postop Day #: 3 from TEM and lap right.    Denies n/v. Small stool this am. Tolerating full liquids. Pain controlled. No dizziness.                Medications:   I have reviewed this patient's current medications               Physical Exam:   Blood pressure (!) 155/85, pulse 80, temperature 98.3  F (36.8  C), temperature source Oral, resp. rate 20, height 1.575 m (5' 2\"), weight 91.5 kg (201 lb 11.5 oz), SpO2 95 %.      Intake/Output Summary (Last 24 hours) at 8/8/2020 1042  Last data filed at 8/8/2020 0912  Gross per 24 hour   Intake 2159 ml   Output 2210 ml   Net -51 ml       GEN:  alert  ABD:  Soft, incisions with slight bruising. No signs of infection         Data:   No new labs            Assessment and Plan:   Doing well  Continue full liquids per patient request. Could adv to low fiber this afternoon if doing well.   Encourage ambulation  Hospitalist to help with dilated cardiomyopathy and HTN.  Path pending. Not likely to need Lovenox at discharge unless path shows cancer.  Await full return of bowel function.       Iris Cornejo MD  [facility]  Office Phone # [phone]    "

## 2020-08-08 NOTE — PLAN OF CARE
Discharge Planner PT   Patient plan for discharge: Patient and spouse would like TCU, concerned about patient needing to negotiate stairs, walk without walker in bathroom  Current status: Pt sitting in bed side chair at the start of treatment with stable vitals. Pt performs sit <> stand transfer x 4 using UE for assistance. Pt's sp02 drops to 87% after ambulation but after resting 2 min increases to > 90%. Pt is in bedside chair at the end of treatment today wtih all needs in reach. Pt ambulates 150' using a FWW with CGA. Pt has significant decrease in donya and is fatigued by the end of ambulation. She denies dizziness today with ambulation. Pt is very pleseant and motivated to participate with PT today.   Barriers to return to prior living situation: Pain, nausea, stairs to enter home, weakness, deconditioning, abdominal precautions  Recommendations for discharge: TCU  Rationale for recommendations: Pt is not currently at baseline for mobility, and is unsafe to discharge home. With continued PT, both IP and after discharge, pt is likely to obtain mobility goals.        Entered by: Ant Elias 08/08/2020 11:19 AM

## 2020-08-08 NOTE — PROGRESS NOTES
[facility]  Consult Progress Note - Hospitalist Service     Date of Admission:  8/5/2020  Consult Requested by: Dr Woods  Reason for Consult:  Hospitalist consult     Assessment & Plan   Briseyda Garcia is a 70 year old female with HTN, hypertrophic obstructive cardiomyopathy, and history of CVA in 1970 with resulting left hemiparesis admitted on 8/5/2020 for an elective rectal polypectomy and lap-assisted right hemicolectomy with Dr Woods.  Hospitalists consulted for postoperative medical comanagement. Her blood pressure has historically been high, per her  she often lets this run as high as 190 systolic at home.  Added prn bp meds and resumed lisinopril and clonidine patch.     Rectal polyp s/p polypectomy and right hemicolectomy, POD#3  Colorectal Surgery managing.  Patient progressing.  Pathology pending.  EBL 20 mL.  PT following.     HOCM   Hypertension: labile.  She prefers to let this run high.  Encouraged med compliance.  Intolerance to numerous agents.  --Continue PTA clonidine patch and lisinopril with hold parameters.    --PRN Hydralazine and labetalol available.    --Needs to follow-up with Dr Aguilar of [facility] 3 months after procedure.    --Tele to monitor HR.       History of remote CVA with left residual hemiparesis  Occurred after trauma; not related to ASCVD or PAD.  Defer to CRS as to when can resume ASA 81.  May need home health care.       ______________________________________________________________________    Interval History:  Blood pressure improving  Stopped vasotec as she is taking PO meds  Feels well, up in chair  Not passing gas yet      Physical Exam   Vital Signs: Temp: 98.3  F (36.8  C) Temp src: Oral BP: (!) 155/76   Heart Rate: 75 Resp: 20 SpO2: 95 % O2 Device: Nasal cannula Oxygen Delivery: 1 LPM  Weight: 201 lbs 11.53 oz    GENERAL:  Pleasant, cooperative, alert. Well developed, well nourished. Up in chair.  HEENT: Normocephalic, atraumatic.  Extra occular mm  intact.  Sclera clear.   PULMONOLOGY: Clear to auscultation bilaterally anteriorly.  Capno on.    CARDIAC: Regular rate and rhythm.  No appreciated murmur.  Normal S1/S2.     ABDOMEN:  Post-op dressings in place.   MUSCULOSKELETAL:  Moving right side well; known left hemiplegia.    NEURO: Alert and oriented x3.  CN II-XII grossly intact and symmetric.      Data   Results for orders placed or performed during the hospital encounter of 08/05/20 (from the past 24 hour(s))   Platelet count   Result Value Ref Range    Platelet Count 179 150 - 450 10e9/L

## 2020-08-08 NOTE — PLAN OF CARE
To Do:  End of Shift Summary  For vital signs and complete assessments, please see documentation flowsheets.     Pertinent assessments: POD#3 Lap assisted Right colectomy and TEM. /86, denies pain. O2 at 1L via NC maintaining 96%. Tele- SR @ 67. # lap site and midline incision closed with dermabond. Up to bathroom with assistance of 1 with GB and walker. BS hypoactive,  no flatus and BM per patient  Major Shift Events: Voided 50 ml, . Straight cath done and drained 460 ml.  Treatment Plan: awaiting bowel function to return.  Discharge Readiness: Medically active  Expected Discharge Date: 1-2 days  Discharge Disposition:  Transitional Care Unit  Barriers/Criteria for discharge: Awaiting bowel function to return.

## 2020-08-08 NOTE — PLAN OF CARE
End of Shift Summary  For vital signs and complete assessments, please see documentation flowsheets.     Pertinent assessments: VSS, BP's improved but remains elevated 162/88, does not meet requirements for PRN intervention. Tele monitoring, SR per tele tech. Denies pain. Abd incisions dermabonded, some bruising noted. BS hypoactive, -gas/BM this shift. Tolerating full liquids, denies nausea. Voiding adequately, bladder scan <400cc not requiring straight cath.  Major Shift Events: n/a  Treatment Plan: Entereg    Discharge Readiness: Medically active  Expected Discharge Date: TBD, 1-2 days?  Discharge Disposition: Transitional Care Unit  Barriers/Criteria for discharge: n/a

## 2020-08-08 NOTE — PROVIDER NOTIFICATION
"Paged admitting provider regarding patient's BP's trending down and concern from patient and .    Per patient and  Arcenio's report, concern from cardiologist regarding pt's BP \"getting too low\" after surgery with underlying cardiomyopathy. Pt's BP previously significantly elevated (210/110's). BP currently 110/70's. Some reports of dizziness, although patient has had reports of this on-and-off despite fluctuating BP readings.    Received call from Dr. Devine. Advised nursing staff that current blood pressure readings are appropriate. Will restart IVF and instructed RN to remove clonidine patch. No further anti-hypertensives scheduled to be given tonight. No further orders.    Patient and  notified. IV established and IVF started. Encouraged PO intake, as well. Will continue to monitor.  "

## 2020-08-09 ENCOUNTER — APPOINTMENT (OUTPATIENT)
Dept: PHYSICAL THERAPY | Facility: CLINIC | Age: 70
DRG: 330 | End: 2020-08-09
Attending: COLON & RECTAL SURGERY
Payer: COMMERCIAL

## 2020-08-09 PROCEDURE — 12000000 ZZH R&B MED SURG/OB

## 2020-08-09 PROCEDURE — 25000132 ZZH RX MED GY IP 250 OP 250 PS 637: Performed by: INTERNAL MEDICINE

## 2020-08-09 PROCEDURE — 99232 SBSQ HOSP IP/OBS MODERATE 35: CPT | Performed by: INTERNAL MEDICINE

## 2020-08-09 PROCEDURE — 97530 THERAPEUTIC ACTIVITIES: CPT | Mod: GP

## 2020-08-09 PROCEDURE — 97116 GAIT TRAINING THERAPY: CPT | Mod: GP

## 2020-08-09 PROCEDURE — 25000128 H RX IP 250 OP 636: Performed by: COLON & RECTAL SURGERY

## 2020-08-09 RX ORDER — CLONIDINE HYDROCHLORIDE 0.1 MG/1
0.1 TABLET ORAL
Status: DISCONTINUED | OUTPATIENT
Start: 2020-08-09 | End: 2020-08-10 | Stop reason: HOSPADM

## 2020-08-09 RX ADMIN — ENOXAPARIN SODIUM 40 MG: 40 INJECTION SUBCUTANEOUS at 12:45

## 2020-08-09 RX ADMIN — LISINOPRIL 20 MG: 20 TABLET ORAL at 09:25

## 2020-08-09 ASSESSMENT — ACTIVITIES OF DAILY LIVING (ADL)
ADLS_ACUITY_SCORE: 21
ADLS_ACUITY_SCORE: 18
ADLS_ACUITY_SCORE: 21
ADLS_ACUITY_SCORE: 18
ADLS_ACUITY_SCORE: 21
ADLS_ACUITY_SCORE: 18

## 2020-08-09 NOTE — PLAN OF CARE
Discharge Planner PT   Patient plan for discharge: Patient and spouse would like TCU, concerned about patient needing to negotiate stairs, walk without walker in bathroom  Current status: Pt sitting in bed at the start of treatment with stable vitals. Supine to sitting bed mobility with SBA but this takes the patient increased time to get to the edge of the bed. Pt requires assistance to don lumbar brace. Pt performs sit <> stand transfer with UE for assistance. PT assists patient to restroom but she is able to perform toileting independently.  Pt is in bedside chair at the end of treatment today wtih all needs in reach.  Pt ambulates 100' using a FWW with CGA. Pt has increased right leg pain today and does not want to walk as far. Pt has significant decrease in donya and is fatigued by the end of ambulation Pt is very pleseant and motivated to participate with PT today.   Barriers to return to prior living situation: Pain, nausea, stairs to enter home, weakness, deconditioning, abdominal precautions  Recommendations for discharge: TCU  Rationale for recommendations: Pt is not currently at baseline for mobility, and is unsafe to discharge home. With continued PT, both IP and after discharge, pt is likely to obtain mobility goals.        Entered by: Ant Elias 08/09/2020 9:14 AM

## 2020-08-09 NOTE — PLAN OF CARE
"/77   Pulse 80   Temp 96.1  F (35.6  C) (Oral)   Resp 18   Ht 1.575 m (5' 2\")   Wt 91.5 kg (201 lb 11.5 oz)   SpO2 96%   BMI 36.90 kg/m    Assumed care at 1930 hrs.     End of Shift Summary   Pertinent assessments: VSS, .Bp 116/77.  LR  infusing at 100 ml/hr.  C/o mild abdominal pain, BS+, +gas and no BM within the last four hours.    Tolerating diet, no nausea. Ambulating to the bathroom with Gbelt and walker. Voiding adequately, low PVRs not requiring intervention. Incisions dermabonded, CDI with some bruising.  Major Shift Events: +BM, advanced to low fiber diet  Treatment Plan: n/a    Discharge Readiness: Medically active  Expected Discharge Date: Tomorrow vs. Monday  Discharge Disposition: Transitional Care Unit [facility]  Barriers/Criteria for discharge: n/a    5:41 AM  Ambulating to the bathroom A1, voiding well , encouraged PO fluids to facilitate output. No complains of pain, evidence of let sided weakness.  Continue POC,  "

## 2020-08-09 NOTE — PROGRESS NOTES
"Colon & Rectal Surgery Progress Note             Interval History:   Postop Day #: 4 from TEM and lap right.    Denies n/v. Tolerating low fiber diet. + flatus no BM today.               Medications:   I have reviewed this patient's current medications               Physical Exam:   Blood pressure 135/78, pulse 80, temperature 98.1  F (36.7  C), temperature source Oral, resp. rate 18, height 1.575 m (5' 2\"), weight 91.5 kg (201 lb 11.5 oz), SpO2 92 %.        Intake/Output Summary (Last 24 hours) at 8/9/2020 1855  Last data filed at 8/9/2020 1457  Gross per 24 hour   Intake 1133 ml   Output 1750 ml   Net -617 ml         GEN:  Alert, oriented NAD  ABD:  Soft, incisions with slight bruising. No signs of infection, nontender         Data:   No new labs            Assessment and Plan:   Low fiber diet  Path pending. Not likely to need Lovenox at discharge unless path shows cancer.  Discharge tomorrow to TCU.       Iris Cornejo MD  [facility]  Office Phone # [phone]    "

## 2020-08-09 NOTE — PROGRESS NOTES
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  Hospitalists consulted for postoperative medical comanagement. Her blood pressure has historically been high, per her  she often lets this run as high as 190 systolic at home.  Added prn bp meds and resumed lisinopril and clonidine patch.  Clonidine patch discontinued on the evening of 8/8/2020 if systolic blood pressure was falling below 140.     Rectal polyp s/p polypectomy and right hemicolectomy, POD#4  Colorectal Surgery managing.  Patient progressing p.m.  Pathology pending.  EBL 20 mL.  PT following.      HOCM   Hypertension: labile.  She prefers to let this run high.  Encouraged med compliance.  Intolerance to numerous agents.  -Clonidine patch discontinued yesterday as systolic blood pressures were running in the 110s.  Reasonable for now.  Discussed with nursing staff.  Do agree that drop to normotensive blood pressure can widen aortic gradient as baseline aortic gradient with hypertrophic cardiomyopathy is already 40 mmHg in reviewing outpatient cardiology's note.  Would shoot for systolic blood pressure between 140-180 and have PRN p.o. clonidine available if systolic blood pressure greater than 200 or diastolic blood pressure greater than 110.  Patient is otherwise asymptomatic.  - May need to resume clonidine patch if systolic blood pressure consistently greater than  "200  --Continue lisinopril with parameters.    --PRN Hydralazine and labetalol available.    --Needs to follow-up with Dr Aguilar of [facility] 3 months after procedure.    --May discontinue telemetry      History of remote CVA with left residual hemiparesis  Occurred after trauma; not related to ASCVD or PAD.  Defer to CRS as to when can resume ASA 81.  May need home health care.      DVT Prophylaxis: Defer to primary service  Code Status: Full Code  Discharge Dispo: Per primary service  Estimated Disch Date / # of Days until Disch: Per primary service.  Okay to discharge from medicine perspective          Interval History (Subjective):      Yesterday PMs events reviewed.  Notable drop in systolic blood pressure the 1 teens.  Patient prefers to have systolic blood pressure bit higher so that she is not symptomatic.  She denies any chest pain or shortness of breath.  Patient was sent with PT.  Some flatus but no bowel movement yet.         Physical Exam:      Vital signs:  Temp: 97.5  F (36.4  C) Temp src: Oral BP: (!) 172/90   Heart Rate: 72 Resp: 20 SpO2: 93 % O2 Device: None (Room air) Oxygen Delivery: 1 LPM Height: 157.5 cm (5' 2\") Weight: 91.5 kg (201 lb 11.5 oz)  Estimated body mass index is 36.9 kg/m  as calculated from the following:    Height as of this encounter: 1.575 m (5' 2\").    Weight as of this encounter: 91.5 kg (201 lb 11.5 oz).    I/O last 3 completed shifts:  In: 893 [P.O.:680; I.V.:213]  Out: 1975 [Urine:1975]  Vitals:    08/05/20 1052   Weight: 91.5 kg (201 lb 11.5 oz)       Constitutional: Awake, alert, cooperative, no apparent distress             Abdomen:  Abdominal binder in place   Skin: No rashes, no cyanosis, dry to touch   Neuro: CN 2-12 intact, no localizing weakness   Extremities: No edema, normal range of motion   HEENT Normocephalic, atraumatic, normal nasal turbinates; oropharynx clear   Neck Supple; nl inspection; trachea midline; no thryomegaly   Psychiatric: A+O " x3. Normal affect          Medications:      All current medications were reviewed with changes reflected in problem list.         Data:      All new lab and imaging data was reviewed.   Labs:  No results for input(s): CULT in the last 168 hours.  Recent Labs   Lab 08/08/20 0703 08/07/20 0629 08/05/20 2021   HGB  --  14.0  --      --  200     Recent Labs   Lab 08/07/20 0629 08/05/20 2021     --    POTASSIUM 3.4  --    CHLORIDE 105  --    CO2 28  --    ANIONGAP 7  --    *  --    BUN 6*  --    CR 0.66 0.72   GFRESTIMATED 89 84   GFRESTBLACK >90 >90   NANI 8.6  --       Imaging:   No results found for this or any previous visit (from the past 24 hour(s)).    Shira Alvarez -[phone]

## 2020-08-09 NOTE — PLAN OF CARE
End of Shift Summary  For vital signs and complete assessments, please see documentation flowsheets.     Pertinent assessments: VSS, BP's improved at 150/70-80's, scheduled medications given, does not meet parameters for PRN anti-hypertensives. BPs decreased throughout shift, down to 116/77 - MD aware, started on IVF, see Provider Notification note. C/o mild abdominal pain, scheduled Tylenol given. BS+, +gas and small/smear BMx2. Tolerating diet, no nausea. Voiding adequately, low PVRs not requiring intervention. Incisions dermabonded, CDI with some bruising.  Major Shift Events: +BM, advanced to low fiber diet  Treatment Plan: n/a    Discharge Readiness: Medically active  Expected Discharge Date: Tomorrow vs. Monday  Discharge Disposition: Transitional Care Unit - [facility] in [place]  Barriers/Criteria for discharge: n/a

## 2020-08-10 VITALS
SYSTOLIC BLOOD PRESSURE: 156 MMHG | RESPIRATION RATE: 16 BRPM | HEIGHT: 62 IN | WEIGHT: 201.72 LBS | HEART RATE: 80 BPM | BODY MASS INDEX: 37.12 KG/M2 | TEMPERATURE: 98.3 F | OXYGEN SATURATION: 95 % | DIASTOLIC BLOOD PRESSURE: 70 MMHG

## 2020-08-10 LAB — COPATH REPORT: NORMAL

## 2020-08-10 PROCEDURE — 25000132 ZZH RX MED GY IP 250 OP 250 PS 637: Performed by: INTERNAL MEDICINE

## 2020-08-10 PROCEDURE — 99231 SBSQ HOSP IP/OBS SF/LOW 25: CPT | Performed by: INTERNAL MEDICINE

## 2020-08-10 PROCEDURE — 25000128 H RX IP 250 OP 636: Performed by: COLON & RECTAL SURGERY

## 2020-08-10 RX ORDER — ACETAMINOPHEN 325 MG/1
650 TABLET ORAL EVERY 4 HOURS PRN
Status: DISCONTINUED | OUTPATIENT
Start: 2020-08-10 | End: 2020-08-10 | Stop reason: HOSPADM

## 2020-08-10 RX ADMIN — ACETAMINOPHEN 650 MG: 325 TABLET, FILM COATED ORAL at 11:04

## 2020-08-10 RX ADMIN — LISINOPRIL 20 MG: 20 TABLET ORAL at 08:24

## 2020-08-10 RX ADMIN — ENOXAPARIN SODIUM 40 MG: 40 INJECTION SUBCUTANEOUS at 11:57

## 2020-08-10 ASSESSMENT — ACTIVITIES OF DAILY LIVING (ADL)
ADLS_ACUITY_SCORE: 20

## 2020-08-10 NOTE — PROGRESS NOTES
"Colon & Rectal Surgery Progress Note             Interval History:   Postop Day #: 4 from TEM and lap right.    Tolerating diet. +Flatus, no BM. Pain controlled.                 Medications:   I have reviewed this patient's current medications               Physical Exam:   Blood pressure (!) 156/70, pulse 80, temperature 98.3  F (36.8  C), temperature source Oral, resp. rate 16, height 1.575 m (5' 2\"), weight 91.5 kg (201 lb 11.5 oz), SpO2 95 %.      Intake/Output Summary (Last 24 hours) at 8/8/2020 1042  Last data filed at 8/8/2020 0912  Gross per 24 hour   Intake 2159 ml   Output 2210 ml   Net -51 ml       GEN:  alert  ABD:  Soft, appropriately tender, mild distension, incisions with slight bruising. No signs of infection         Data:   No new labs            Assessment and Plan:   Doing well  LF diet.   Encourage ambulation  Hospitalist to help with dilated cardiomyopathy and HTN.  Path discussed - no malignancy.   Lovenox while inpatient. Does not require at discharge.   Ok to discharge to TCU today. Follow-up with Dr. Woods in 2-3 weeks.        Darcie Montejo MD  [facility]  Office Phone # [phone]    "

## 2020-08-10 NOTE — PLAN OF CARE
Assumed care at 1930     End of Shift Summary  For vital signs and complete assessments, please see documentation flowsheets.     Pertinent assessments: VSS   Denies pain. BS+, +gas, -stool this shift. Tolerating low fiber diet, no nausea. Voiding adequately. Incisions dermabonded, CDI with some bruising.  Ambulating to the bathroom A1 with G belt and walker. .  Major Shift Events: n/a  Treatment Plan: n/a    Discharge Readiness: Medically active  Expected Discharge Date: Tomorrow, 8/10  Discharge Disposition: [facility] - [facility] in [place],  to transport  Barriers/Criteria for discharge: n/a

## 2020-08-10 NOTE — PLAN OF CARE
End of Shift Summary  For vital signs and complete assessments, please see documentation flowsheets.     Pertinent assessments: VSS, BP's 130-170/'s. Denies pain. BS+, +gas, -stool this shift. Tolerating low fiber diet, no nausea. Voiding adequately. Incisions dermabonded, CDI with some bruising. Walking up in halls.   at bedside.  Major Shift Events: n/a  Treatment Plan: n/a    Discharge Readiness: Medically active  Expected Discharge Date: Tomorrow, 8/10  Discharge Disposition: Transitional Care Unit - [facility] in [place],  to transport  Barriers/Criteria for discharge: n/a

## 2020-08-10 NOTE — PROGRESS NOTES
[facility]  Hospitalist Progress Note  Jeovanny Walker MD, MD 08/10/2020  (Text Page)    Reason for Stay (Diagnosis): Rectal polyp status post polypectomy with right hemicolectomy          Assessment and Plan:      Summary of Stay:  Briseyda Garcia is a 70 year old female with HTN, hypertrophic obstructive cardiomyopathy, and history of CVA in 1970 with resulting left hemiparesis admitted on 8/5/2020 for an elective rectal polypectomy and lap-assisted right hemicolectomy with Dr Woods.  Hospitalists consulted for postoperative medical comanagement. Her blood pressure has historically been high, per her  she often lets this run as high as 190 systolic at home.  Added prn bp meds and resumed lisinopril and clonidine patch.  Clonidine patch discontinued on the evening of 8/8/2020 if systolic blood pressure was falling below 140.     Rectal polyp s/p polypectomy and right hemicolectomy, POD#4  Colorectal Surgery managing.  Patient progressing p.m.  Pathology pending.  EBL 20 mL.  PT following.      HOCM   Hypertension: labile.  She prefers to let this run high.  Encouraged med compliance.  Intolerance to numerous agents.  -Clonidine patch discontinued yesterday as systolic blood pressures were running in the 110s.  Reasonable for now.  Discussed with nursing staff.  Do agree that drop to normotensive blood pressure can widen aortic gradient as baseline aortic gradient with hypertrophic cardiomyopathy is already 40 mmHg in reviewing outpatient cardiology's note.  Would shoot for systolic blood pressure between 140-180 and have PRN p.o. clonidine available if systolic blood pressure greater than 200 or diastolic blood pressure greater than 110.  Patient is otherwise asymptomatic.  - May need to resume clonidine patch if systolic blood pressure consistently greater than 200  --Continue lisinopril with parameters.    --PRN Hydralazine and labetalol available.    --Needs to follow-up with Dr Aguilar  "of [facility] 3 months after procedure.    --May discontinue telemetry      History of remote CVA with left residual hemiparesis  Occurred after trauma; not related to ASCVD or PAD.  Aspirin has been resumed upon discharge.  May need home health care.       DVT Prophylaxis: Defer to primary service  Code Status: Full Code  Discharge Dispo: Per primary service  Estimated Disch Date / # of Days until Disch:  Discharge orders, instructions in place as ordered by colorectal service               Interval History (Subjective):      Seen and examined.  Chart reviewed.  Case discussed with nursing service.  Ms. Garcia  is feeling better.  No complaints of nausea, vomiting.  Passing flatus.  Pain under control.  Afebrile.                Physical Exam:      Last Vital Signs:  BP (!) 156/70 (BP Location: Left arm)   Pulse 80   Temp 98.3  F (36.8  C) (Oral)   Resp 16   Ht 1.575 m (5' 2\")   Wt 91.5 kg (201 lb 11.5 oz)   SpO2 95%   BMI 36.90 kg/m      I/O last 3 completed shifts:  In: 920 [P.O.:920]  Out: 1250 [Urine:1250]  Wt Readings from Last 1 Encounters:   08/05/20 91.5 kg (201 lb 11.5 oz)       Constitutional: Awake, alert, cooperative, no apparent distress   Respiratory: Clear to auscultation bilaterally, no crackles or wheezing   Cardiovascular: Regular rate and rhythm, normal S1 and S2, and no murmur noted   Abdomen: Normal bowel sounds, soft, non-distended, non-tender   Skin: No rashes, no cyanosis, dry to touch   Neuro: Alert and oriented x3, no weakness, spontaneous and coherent speech   Extremities: No edema, normal range of motion   Other(s): Euthymic mood, not agitated       All other systems: Negative          Medications:      All current medications were reviewed with changes reflected in problem list.         Data:      All new lab and imaging data was reviewed.   Labs:  No results for input(s): CULT in the last 168 hours.  Recent Labs   Lab 08/07/20  0629 08/05/20 2021     --  "   POTASSIUM 3.4  --    CHLORIDE 105  --    CO2 28  --    ANIONGAP 7  --    *  --    BUN 6*  --    CR 0.66 0.72   GFRESTIMATED 89 84   GFRESTBLACK >90 >90   NANI 8.6  --      Recent Labs   Lab 08/08/20  0703 08/07/20  0629 08/05/20 2021   HGB  --  14.0  --      --  200     No results for input(s): INR in the last 168 hours.  No results for input(s): TROPONIN, TROPI, TROPR in the last 168 hours.    Invalid input(s): TROP, TROPONINIES   Imaging:   No results found for this or any previous visit.

## 2020-08-10 NOTE — PROGRESS NOTES
SPIRITUAL HEALTH SERVICES: Tele-Encounter    Patient Location:  Med. Surg. 5  Spoke with: Patient    Referral Source: Contact pt per length of stay.    DATA:  Pt is in the hospital for surgery.  She is going to have rehab this afternoon.  Her  is at the hospital with her.  I offered spiritual and emotional support, but she declined.      PLAN:  Pt can contact [facility] per her request.      Arthur Poon Intern    ______________________________    Type of service:  Telephone Visit     has received verbal consent for a TelephoneVisit from the patient?Yes  Distance Provider Location: designated [place] office or home office (secure setting)    Mode of Communication: telephone (via [employer] phone or [facility] tele-call-number ([phone]))

## 2020-08-10 NOTE — DISCHARGE SUMMARY
[facility] Discharge Summary      Briseyda Garcia MRN# [id number]   Age: 70 year old YOB: 1950     Date of Admission:  8/5/2020  Date of Discharge::  8/10/2020  Admitting Physician:  Ngoc Woods MD  Discharge Physician:  Ngoc Woods MD     PCP:  Dino Banks    Disposition: Patient discharged from [facility] to TCU in stable condition.        Primary Diagnosis:   Large rectal polyp and large right colon polyp            Discharge Medications:   Current Discharge Medication List      CONTINUE these medications which have NOT CHANGED    Details   aspirin (ASA) 81 MG tablet Take 81 mg by mouth daily       cloNIDine (CATAPRES) 0.1 MG tablet Take 0.1 mg by mouth daily as needed When diastolic BP is over 100 mmhg.      cloNIDine (CATAPRES-TTS1) 0.1 MG/24HR WK patch Place 1 patch onto the skin once a week On tuesdays      lisinopril (PRINIVIL/ZESTRIL) 20 MG tablet Take 20 mg by mouth daily                     Follow Up, Special Instructions:   Discharge diet: Low residue   Discharge activity: No heavy lifting, pushing, pulling for 6-8 week(s)   Discharge follow-up: Follow up with Dr. Woods in about 3 weeks   Wound care: Ice to area for comfort  Keep wound clean and dry  May get incision wet in shower but do not soak or scrub              Procedures:   Procedure(s): Transanal endoscopic microsurgical resection of a large rectal polyp and a laparoscopic-assisted right hemicolectomy        No other procedures performed during this admission            Consultations:   Hospitalist  PT          Brief Hospital Summary:   Patient is a 70 year old woman who underwent transanal endoscopic microsurgical resection of a large rectal polyp and a laparoscopic-assisted right hemicolectomy on 8/5/20 by Dr. Woods.   There were no immediate complications during this procedure.    Please refer to the full operative summary for details.  The patient's hospital course was unremarkable.   Pain was controlled on oral pain regimen.  She was tolerating a low fiber diet.  Bowel function had returned prior to discharge.  She recovered as anticipated and experienced no post-operative complications.        Attestation:  I have reviewed today's vital signs, notes, medications, labs and imaging.    Lian Raman PA-C  [facility]          ADDENDUM:  Length of stay: 5 days  Indicate Y or N for the following:  UTI  No  C diff  No  PNA  No  SSI No  DVT No  PE  No  CVA No  MI No  Enterocutaneous fistula  No  Peripheral nerve injury  No  Abscess (not adjacent to anastomosis)  No  Leak No    Treated with:   Antibiotics N/A   Drain  N/A   Reoperation  N/A  Death within 30 days No  Reintubation  No  Reoperation  No   Procedure N/A    Pathology:  SPECIMEN(S):   A: Rectal polyp   B: Terminal ileum, right colon, appendix     FINAL DIAGNOSIS:   A: Rectal polyp, transanal excision.   - The specimen includes mucosa, submucosa, and areas with superficial   muscularis propria.   - Serrated polyp consistent with traditional serrated adenoma with focal   high grade dysplasia.   - No invasive carcinoma identified.   - Right mucosal margin focally positive for adenoma with low grade   dysplasia but negative high grade   dysplasia.   - Remaining mucosal and deep margins negative for adenoma, high grade   dysplasia, and malignancy.     B: Terminal ileum, right colon and appendix, right colectomy.   - Largest polyp tubulovillous adenoma with areas of high-grade dysplasia.   - Smaller polyp tubulovillous adenoma without high-grade dysplasia.   - No invasive carcinoma identified.   - 25 lymph nodes negative for malignancy (0/25).   - Margins negative for high-grade dysplasia and adenomatous changes.

## 2020-08-12 ENCOUNTER — RECORDS - HEALTHEAST (OUTPATIENT)
Dept: LAB | Facility: CLINIC | Age: 70
End: 2020-08-12

## 2020-08-12 LAB
ANION GAP SERPL CALCULATED.3IONS-SCNC: 12 MMOL/L (ref 5–18)
BUN SERPL-MCNC: 9 MG/DL (ref 8–28)
CALCIUM SERPL-MCNC: 8.4 MG/DL (ref 8.5–10.5)
CHLORIDE BLD-SCNC: 106 MMOL/L (ref 98–107)
CO2 SERPL-SCNC: 24 MMOL/L (ref 22–31)
CREAT SERPL-MCNC: 0.61 MG/DL (ref 0.6–1.1)
GFR SERPL CREATININE-BSD FRML MDRD: >60 ML/MIN/1.73M2
GLUCOSE BLD-MCNC: 78 MG/DL (ref 70–125)
POTASSIUM BLD-SCNC: 3.6 MMOL/L (ref 3.5–5)
SODIUM SERPL-SCNC: 142 MMOL/L (ref 136–145)

## 2020-09-05 NOTE — PLAN OF CARE
Physical Therapy Discharge Summary     Reason for therapy discharge:    Discharged to transitional care facility.     Progress towards therapy goal(s). [facility] for goal details.  Goals partially met.  Barriers to achieving goals:   Needing A for bed mob, donning of abd brace and for transfers. Tolerating 100 feet of ambulation with CGA and FWW     Therapy recommendation(s):    Continued therapy is recommended.  Rationale/Recommendations:  Pt is below baseline with functional mobility and strength and would benefit from continued PT to progress skills.     Never smoker

## 2024-10-11 NOTE — PLAN OF CARE
PER EUGENIE, PATIENT CALLED WHILE I WAS AT LUNCH, I CALLED PATIENT BACK AND LEFT MESSAGE FOR PATIENT, I WAS JUST RETURNING PATIENT CALL.   To Do:  End of Shift Summary  For vital signs and complete assessments, please see documentation flowsheets.     Pertinent assessments: Denies pain and SOB. Midline incision and lap site with some bruising,  closed with dermabond and open to air. Up assistance to bathroom with walker and GB,voiding well. Per pt passing gas but - stool this shift.  Major Shift Events: none  Treatment Plan: n/a    Discharge Readiness: Medically active  Expected Discharge Date: 8/10  Discharge Disposition:  Transitional Care Unit - [facility] in [place].  to transport  Barriers/Criteria for discharge: n/a

## (undated) DEVICE — DRAPE LEGGINGS 8421

## (undated) DEVICE — KIT PATIENT POSITIONING PIGAZZI LATEX FREE 40580

## (undated) DEVICE — LINEN DRAPE 54X72" 5467

## (undated) DEVICE — LINEN FULL SHEET 5511

## (undated) DEVICE — SUCTION CANISTER MEDIVAC LINER 3000ML W/LID 65651-530

## (undated) DEVICE — TUBING SUCTION 12"X1/4" N612

## (undated) DEVICE — Device

## (undated) DEVICE — SUCTION TIP YANKAUER W/O VENT K86

## (undated) DEVICE — ESU HARMONIC SCALPEL SHEARS 36CMX5MM ACE36E

## (undated) DEVICE — SU PDS II 0 CTX 60" Z990G

## (undated) DEVICE — SPONGE LAP 18X18" X8435

## (undated) DEVICE — NDL COUNTER 40CT  31142311

## (undated) DEVICE — TUBING SUCTION MEDI-VAC SOFT 3/16"X20' N520A

## (undated) DEVICE — PAD CHUX UNDERPAD 30X36" P3036C

## (undated) DEVICE — BAG CLEAR TRASH 1.3M 39X33" P4040C

## (undated) DEVICE — LINEN HALF SHEET 5512

## (undated) DEVICE — ADH SKIN CLOSURE PREMIERPRO EXOFIN 1.0ML 3470

## (undated) DEVICE — SOL NACL 0.9% IRRIG 1000ML BOTTLE 2F7124

## (undated) DEVICE — LINEN POUCH DBL 5427

## (undated) DEVICE — LINEN TOWEL PACK X10 5473

## (undated) DEVICE — SU VICRYL 3-0 SH-1 CR 8X18" J772D

## (undated) DEVICE — SU VICRYL 0 UR-6 27" J603H

## (undated) DEVICE — ESU CORD MONOPOLAR 10'  E0510

## (undated) DEVICE — ESU PENCIL W/HOLSTER E2350H

## (undated) DEVICE — EVAC SYSTEM CLEAR FLOW SC082500

## (undated) DEVICE — DRAPE MAYO STAND 23X54 8337

## (undated) DEVICE — PREP POVIDONE IODINE SOLUTION 10% 120ML

## (undated) DEVICE — NDL 25GA 1.5" 305127

## (undated) DEVICE — ESU GROUND PAD ADULT W/CORD E7507

## (undated) DEVICE — ESU LIGASURE LAPAROSCOPIC BLUNT TIP SEALER 5MMX37CM LF1837

## (undated) DEVICE — GLOVE PROTEXIS W/NEU-THERA 7.0  2D73TE70

## (undated) DEVICE — CATH TRAY FOLEY SURESTEP 16FR DRAIN BAG STATOCK A899916

## (undated) DEVICE — PANTIES MESH LG/XLG 2PK 706M2

## (undated) DEVICE — STPL RELOAD LINEAR 90 X 3.5MM  TA9035L

## (undated) DEVICE — SU WND CLOSURE VLOC 90 ABS 2-0 VIOLET 6" GS-22 VLOCM2105

## (undated) DEVICE — SU VICRYL 2-0 TIE 12X18" J905T

## (undated) DEVICE — DRSG KERLIX FLUFFS X5

## (undated) DEVICE — PREP CHLORAPREP 26ML TINTED ORANGE  260815

## (undated) DEVICE — SU MONOCRYL 4-0 PS-2 27" UND Y426H

## (undated) DEVICE — SU VICRYL 3-0 SH 27" UND J416H

## (undated) DEVICE — SUCTION TIP POOLE K770

## (undated) DEVICE — DRSG COTTON BALL 6PK LCB62

## (undated) DEVICE — GOWN IMPERVIOUS SPECIALTY XLG/XLONG 32474

## (undated) DEVICE — ENDO TROCAR SLEEVE KII Z-THREADED 05X100MM CTS02

## (undated) DEVICE — DRAPE IOBAN INCISE 23X17" 6650EZ

## (undated) DEVICE — STPL LINEAR CUT 75MM TLC75

## (undated) DEVICE — ENDO TROCAR FIRST ENTRY KII FIOS Z-THRD 05X100MM CTF03

## (undated) DEVICE — PROTECTOR ARM ONE-STEP TRENDELENBURG 40418

## (undated) RX ORDER — LIDOCAINE HYDROCHLORIDE 10 MG/ML
INJECTION, SOLUTION EPIDURAL; INFILTRATION; INTRACAUDAL; PERINEURAL
Status: DISPENSED
Start: 2020-08-05

## (undated) RX ORDER — FENTANYL CITRATE 50 UG/ML
INJECTION, SOLUTION INTRAMUSCULAR; INTRAVENOUS
Status: DISPENSED
Start: 2020-08-05

## (undated) RX ORDER — EPHEDRINE SULFATE 50 MG/ML
INJECTION, SOLUTION INTRAMUSCULAR; INTRAVENOUS; SUBCUTANEOUS
Status: DISPENSED
Start: 2020-08-05

## (undated) RX ORDER — DEXAMETHASONE SODIUM PHOSPHATE 4 MG/ML
INJECTION, SOLUTION INTRA-ARTICULAR; INTRALESIONAL; INTRAMUSCULAR; INTRAVENOUS; SOFT TISSUE
Status: DISPENSED
Start: 2020-08-05

## (undated) RX ORDER — METHYLERGONOVINE MALEATE 0.2 MG/ML
INJECTION INTRAVENOUS
Status: DISPENSED
Start: 2020-08-05

## (undated) RX ORDER — BUPIVACAINE HYDROCHLORIDE AND EPINEPHRINE 2.5; 5 MG/ML; UG/ML
INJECTION, SOLUTION EPIDURAL; INFILTRATION; INTRACAUDAL; PERINEURAL
Status: DISPENSED
Start: 2020-08-05

## (undated) RX ORDER — GLYCOPYRROLATE 0.2 MG/ML
INJECTION INTRAMUSCULAR; INTRAVENOUS
Status: DISPENSED
Start: 2020-08-05

## (undated) RX ORDER — ONDANSETRON 2 MG/ML
INJECTION INTRAMUSCULAR; INTRAVENOUS
Status: DISPENSED
Start: 2020-08-05

## (undated) RX ORDER — FENTANYL CITRATE-0.9 % NACL/PF 10 MCG/ML
PLASTIC BAG, INJECTION (ML) INTRAVENOUS
Status: DISPENSED
Start: 2020-08-05

## (undated) RX ORDER — NEOSTIGMINE METHYLSULFATE 1 MG/ML
VIAL (ML) INJECTION
Status: DISPENSED
Start: 2020-08-05

## (undated) RX ORDER — ACETAMINOPHEN 325 MG/1
TABLET ORAL
Status: DISPENSED
Start: 2020-08-05

## (undated) RX ORDER — MINERAL OIL
OIL (ML) MISCELLANEOUS
Status: DISPENSED
Start: 2020-08-05

## (undated) RX ORDER — PROPOFOL 10 MG/ML
INJECTION, EMULSION INTRAVENOUS
Status: DISPENSED
Start: 2020-08-05

## (undated) RX ORDER — HEPARIN SODIUM 5000 [USP'U]/.5ML
INJECTION, SOLUTION INTRAVENOUS; SUBCUTANEOUS
Status: DISPENSED
Start: 2020-08-05